# Patient Record
Sex: MALE | Race: WHITE | Employment: FULL TIME | URBAN - METROPOLITAN AREA
[De-identification: names, ages, dates, MRNs, and addresses within clinical notes are randomized per-mention and may not be internally consistent; named-entity substitution may affect disease eponyms.]

---

## 2020-08-06 ENCOUNTER — HOSPITAL ENCOUNTER (EMERGENCY)
Facility: CLINIC | Age: 45
Discharge: HOME | End: 2020-08-06
Attending: EMERGENCY MEDICINE | Admitting: EMERGENCY MEDICINE
Payer: COMMERCIAL

## 2020-08-06 VITALS
DIASTOLIC BLOOD PRESSURE: 83 MMHG | SYSTOLIC BLOOD PRESSURE: 127 MMHG | WEIGHT: 147 LBS | TEMPERATURE: 98.3 F | HEART RATE: 69 BPM | OXYGEN SATURATION: 96 % | RESPIRATION RATE: 18 BRPM

## 2020-08-06 DIAGNOSIS — M54.41 ACUTE RIGHT-SIDED LOW BACK PAIN WITH RIGHT-SIDED SCIATICA: ICD-10-CM

## 2020-08-06 PROCEDURE — 99282 EMERGENCY DEPT VISIT SF MDM: CPT | Performed by: EMERGENCY MEDICINE

## 2020-08-06 PROCEDURE — 99284 EMERGENCY DEPT VISIT MOD MDM: CPT | Mod: Z6 | Performed by: EMERGENCY MEDICINE

## 2020-08-06 RX ORDER — CYCLOBENZAPRINE HCL 10 MG
10 TABLET ORAL 3 TIMES DAILY PRN
Qty: 30 TABLET | Refills: 1 | Status: SHIPPED | OUTPATIENT
Start: 2020-08-06

## 2020-08-06 RX ORDER — HYDROCODONE BITARTRATE AND ACETAMINOPHEN 5; 325 MG/1; MG/1
1-2 TABLET ORAL EVERY 4 HOURS PRN
Qty: 15 TABLET | Refills: 0 | Status: SHIPPED | OUTPATIENT
Start: 2020-08-06

## 2020-08-06 NOTE — ED PROVIDER NOTES
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 He awoke this morning with severe right low back pain, sharper and radiating to the right leg to the level of the knee.  No relief with OTC analgesics.  No leg weakness or sensory deficit.  No bowel or bladder dysfunction.  No abdominal pain.  No fever or chills.  No prior history of any significant back problems    Allergies:  No Known Allergies    Problem List:    There are no active problems to display for this patient.       Past Medical History: Reviewed, noncontributory.  No past medical history on file.    Past Surgical History: Left inguinal herniorrhaphy  No past surgical history on file.    Family History: Noncontributory.  No family history on file.    Social History:  Marital Status:  Single [1]  Social History     Tobacco Use     Smoking status: Not on file   Substance Use Topics     Alcohol use: Not on file     Drug use: Not on file        Medications:    cyclobenzaprine (FLEXERIL) 10 MG tablet  HYDROcodone-acetaminophen (NORCO) 5-325 MG tablet        Review of Systems   Constitutional: Negative.    Respiratory: Negative.    Neurological: Negative.  Negative for weakness and numbness.       Physical Exam   BP: (!) 176/69  Pulse: 68  Temp: 98.3  F (36.8  C)  Resp: 18  Weight: 66.7 kg (147 lb)  SpO2: 96 %      Physical Exam  Vitals signs and nursing note reviewed.   Constitutional:       General: He is not in acute distress.     Appearance: Normal appearance. He is well-developed. He is not ill-appearing or diaphoretic.   HENT:      Head: Normocephalic and atraumatic.      Right Ear: External ear normal.       Left Ear: External ear normal.      Nose: Nose normal.      Mouth/Throat:      Mouth: Mucous membranes are moist.   Eyes:      General: No scleral icterus.     Extraocular Movements: Extraocular movements intact.      Conjunctiva/sclera: Conjunctivae normal.   Neck:      Musculoskeletal: Normal range of motion and neck supple.      Trachea: No tracheal deviation.   Cardiovascular:      Rate and Rhythm: Normal rate and regular rhythm.      Pulses: Normal pulses.   Pulmonary:      Effort: Pulmonary effort is normal. No respiratory distress.   Abdominal:      General: There is no distension.      Palpations: Abdomen is soft.      Tenderness: There is no abdominal tenderness. There is no right CVA tenderness or left CVA tenderness.   Musculoskeletal: Normal range of motion.         General: No swelling.      Lumbar back: He exhibits tenderness and spasm. He exhibits normal range of motion, no bony tenderness, no swelling and no deformity.        Back:       Right lower leg: No edema.      Left lower leg: No edema.   Skin:     General: Skin is warm and dry.      Coloration: Skin is not pale.      Findings: No erythema or rash.   Neurological:      General: No focal deficit present.      Mental Status: He is alert and oriented to person, place, and time.      Sensory: No sensory deficit.      Motor: No weakness.      Coordination: Coordination normal.   Psychiatric:         Mood and Affect: Mood normal.         Behavior: Behavior normal.         ED Course        Procedures               No results found for this or any previous visit (from the past 24 hour(s)).    Medications - No data to display    Assessments & Plan (with Medical Decision Making)   44-year-old  with insidious onset of atraumatic right low back pain yesterday who awoke with worsened pain and right upper leg radiculopathy to the right knee level, but no further.  No weakness or sensory deficit.  No evidence of cauda equina syndrome.  No  prior history of significant back issues.  He is neurologically intact and there is no current indication for imaging evaluation.  He was discharged with Rx for Flexeril for spasm and Norco to use if needed for pain refractory to NSAID.  I will make an orthopedic  referral for his follow-up.  He was provided instructions for supportive care and will return as needed for worsened condition or worsening symptoms, motor or sensory deficit or bowel or bladder incontinence, or for new problems or concerns.      I have reviewed the nursing notes.    I have reviewed the findings, diagnosis, plan and need for follow up with the patient.    New Prescriptions    CYCLOBENZAPRINE (FLEXERIL) 10 MG TABLET    Take 1 tablet (10 mg) by mouth 3 times daily as needed for muscle spasms    HYDROCODONE-ACETAMINOPHEN (NORCO) 5-325 MG TABLET    Take 1-2 tablets by mouth every 4 hours as needed for moderate to severe pain maximum 6 tablet(s) per day       Final diagnoses:   Acute right-sided low back pain with right-sided sciatica       8/6/2020   [facility] EMERGENCY DEPARTMENT     Mayur Siddiqi MD  08/08/20 [id number]

## 2020-08-06 NOTE — ED AVS SNAPSHOT
[facility] Emergency Department  [address] [address]  Phone:  [phone]  Fax:  [phone]                                    Randall Bateman   MRN: [id number]    Department:  [facility] Emergency Department   Date of Visit:  8/6/2020           After Visit Summary Signature Page    I have received my discharge instructions, and my questions have been answered. I have discussed any challenges I see with this plan with the nurse or doctor.    ..........................................................................................................................................  Patient/Patient Representative Signature      ..........................................................................................................................................  Patient Representative Print Name and Relationship to Patient    ..................................................               ................................................  Date                                   Time    ..........................................................................................................................................  Reviewed by Signature/Title    ...................................................              ..............................................  Date                                               Time          22EPIC Rev 08/18

## 2020-08-06 NOTE — ED NOTES
Right-sided low back pain that radiates down back and front of right leg that started this morning.  Patient stated that his back started hurting yesterday while he was at work.   Patient has a history of back pain.

## 2020-08-08 ASSESSMENT — ENCOUNTER SYMPTOMS
NEUROLOGICAL NEGATIVE: 1
CONSTITUTIONAL NEGATIVE: 1
RESPIRATORY NEGATIVE: 1
NUMBNESS: 0
WEAKNESS: 0

## 2020-08-10 ENCOUNTER — ANCILLARY PROCEDURE (OUTPATIENT)
Dept: GENERAL RADIOLOGY | Facility: CLINIC | Age: 45
End: 2020-08-10
Attending: FAMILY MEDICINE
Payer: COMMERCIAL

## 2020-08-10 ENCOUNTER — OFFICE VISIT (OUTPATIENT)
Dept: ORTHOPEDICS | Facility: CLINIC | Age: 45
End: 2020-08-10
Payer: COMMERCIAL

## 2020-08-10 VITALS — WEIGHT: 147 LBS | DIASTOLIC BLOOD PRESSURE: 70 MMHG | SYSTOLIC BLOOD PRESSURE: 122 MMHG

## 2020-08-10 DIAGNOSIS — M54.41 ACUTE RIGHT-SIDED LOW BACK PAIN WITH RIGHT-SIDED SCIATICA: ICD-10-CM

## 2020-08-10 PROCEDURE — 72100 X-RAY EXAM L-S SPINE 2/3 VWS: CPT

## 2020-08-10 PROCEDURE — 99204 OFFICE O/P NEW MOD 45 MIN: CPT | Performed by: FAMILY MEDICINE

## 2020-08-10 RX ORDER — METHYLPREDNISOLONE 4 MG
TABLET, DOSE PACK ORAL
Qty: 21 TABLET | Refills: 0 | Status: SHIPPED | OUTPATIENT
Start: 2020-08-10

## 2020-08-10 RX ORDER — NABUMETONE 500 MG/1
500 TABLET, FILM COATED ORAL 2 TIMES DAILY
Qty: 30 TABLET | Refills: 0 | Status: SHIPPED | OUTPATIENT
Start: 2020-08-10

## 2020-08-10 NOTE — LETTER
8/10/2020         RE: Randall Bateman  [id number]  [facility] [address]        Dear Colleague,    Thank you for referring your patient, Randall Bateman, to the [facility]. Please see a copy of my visit note below.    ASSESSMENT & PLAN  Randall was seen today for pain.    Diagnoses and all orders for this visit:    Acute right-sided low back pain with right-sided sciatica  -     Orthopedic & Spine  Referral  -     XR Lumbar Spine 2-3 Views*; Future  -     methylPREDNISolone (MEDROL DOSEPAK) 4 MG tablet therapy pack; Follow Package Directions  -     nabumetone (RELAFEN) 500 MG tablet; Take 1 tablet (500 mg) by mouth 2 times daily  -     PHYSICAL THERAPY REFERRAL (Internal); Future      Patient is a 44 year old male presenting for evaluation of   Chief Complaint   Patient presents with     Lower Back - Pain      # Right Lumbar Radiculopathy: Acute on chronic sx notable over the past 5 days 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  Pt noting right low back pain with radicular sx to the knee.  Pt has intact ROM, pos Slump on the right.  XR showing mild DDD.  No red flag symptoms/signs on history or examination.  Counseled patient on nature of condition and treatment options.  Given this plan as below, follow-up as needed.   Treatment: Activities as tolerated, letter written for work  Physical Therapy Referred today when pain is improving  Injection defer for now pt will need MRI should this be needed  Medications Medrol dosepak today, flexeril at night, can take Relafen after Medrol dosepak    Concerning signs/sx that would warrant urgent evaluation were discussed.  All questions were answered, patient understands and agrees with plan.      Return in about 3 weeks (around 8/31/2020).  -----    SUBJECTIVE  Randall Bateman is a/an 44 year old male who is seen in consultation at the request of  Mayur Siddiqi M.D. for evaluation of right sided low back  pain. The patient is seen by themselves.    Onset: 8/5/20, 5 day(s) ago. Reports insidious onset without acute precipitating event. Patient was seen in ED 8/6/20.   Location of Pain: right buttock, thigh to knee   Rating of Pain at worst: 10/10  Rating of Pain Currently: 9/10  Worsened by: standing, walking  Better with: hip flexion   Treatments tried:  Tylenol, Ibuprofen, flexeril, Norco   Quality: burning, sharp, dull  Red flags: Weakness: No, bowel/bladder loss: No, foot drop: No  Associated symptoms: numbness and tingling in anterior thigh   Orthopedic history: NO  Relevant surgical history: NO  Social: Works in construction   Hobbies: none  History reviewed. No pertinent past medical history.  Social History     Socioeconomic History     Marital status: Single     Spouse name: Not on file     Number of children: Not on file     Years of education: Not on file     Highest education level: Not on file   Occupational History     Not on file   Social Needs     Financial resource strain: Not on file     Food insecurity     Worry: Not on file     Inability: Not on file     Transportation needs     Medical: Not on file     Non-medical: Not on file   Tobacco Use     Smoking status: Not on file   Substance and Sexual Activity     Alcohol use: Not on file     Drug use: Not on file     Sexual activity: Not on file   Lifestyle     Physical activity     Days per week: Not on file     Minutes per session: Not on file     Stress: Not on file   Relationships     Social connections     Talks on phone: Not on file     Gets together: Not on file     Attends L[employer]-day service: Not on file     Active member of club or organization: Not on file     Attends meetings of clubs or organizations: Not on file     Relationship status: Not on file     Intimate partner violence     Fear of current or ex partner: Not on file     Emotionally abused: Not on file     Physically abused: Not on file     Forced sexual activity: Not on file   Other  Topics Concern     Not on file   Social History Narrative     Not on file     Patient's past medical, surgical, social, and family histories were reviewed today and no changes are noted.  No family history pertinent to the patient's problem today     REVIEW OF SYSTEMS:  10 point ROS is negative other than symptoms noted above in HPI, Past Medical History or as stated below  Constitutional: NEGATIVE for fever, chills, change in weight  Skin: NEGATIVE for worrisome rashes, moles or lesions  GI/: NEGATIVE for bowel or bladder changes  Neuro: NEGATIVE for weakness, dizziness or paresthesias    OBJECTIVE:  /70   Wt 66.7 kg (147 lb)    General: healthy, alert and in no distress  HEENT: no scleral icterus or conjunctival erythema  Skin: no suspicious lesions or rash. No jaundice.  CV: no pedal edema  Resp: normal respiratory effort without conversational dyspnea   Psych: normal mood and affect  Gait: normal steady gait with appropriate coordination and balance  Neuro: normal light touch sensory exam of the bilateral lower extremities.  DTR's 2+ patella and achilles bilaterally.  MSK:  THORACIC/LUMBAR SPINE  Inspection:    No gross deformity/asymmetry  Palpation:    Tender about the right para lumbar muscles, right SI joint and right sciatic notch. Otherwise remainder of landmarks are nontender.  Range of Motion:     Lumbar flexion full    Lumbar extension full  Strength:    5/5 - quadriceps, hamstrings, tibialis anterior, gastrocsoleus, and extensor hallicus longus  Special Tests:    Positive: slump test (right)    Negative: slump test left straight leg raise (bilateral), FREDDIE (bilateral), FADIR (bilateral)    BILATERAL HIP  Inspection:    No obvious deformity or asymmetry, pelvis level  Palpation:  Nontender.  Active Range of Motion:     Flexion full, IR full, ER  full  Strength:    Flexion 5/5, adduction 5/5, abduction 5/5  Special Tests:    Positive: None    Negative: Logroll, FREDDIE, anterior impingement  (FADIR)    Independent visualization of the below image:  Recent Results (from the past 24 hour(s))   XR Lumbar Spine 2-3 Views*    Narrative    LUMBAR SPINE TWO TO THREE VIEWS   8/10/2020 3:25 PM     HISTORY: Acute right-sided low back pain with right-sided sciatica.    COMPARISON: None.      Impression    IMPRESSION: Mild lower lumbar degenerative disc and facet disease is  present. Vertebral body heights are maintained. Posterior alignment is  normal. Sacroiliac joints are unremarkable.    RADHA CID MD       I  ordered, visualized and reviewed these images with the patient  Mild DDD no anterolisthesis     Patient's conditions were thoroughly discussed during today's visit with greater than 50% of the visit spent counseling the patient with total time spent face-to-face with the patient being 25 minutes.    Mark Alonso MD [facility]    Disclaimer: This note consists of symbols derived from keyboarding, dictation and/or voice recognition software. As a result, there may be errors in the script that have gone undetected. Please consider this when interpreting information found in this chart.          Again, thank you for allowing me to participate in the care of your patient.        Sincerely,        Mark Alonso MD

## 2020-08-10 NOTE — PATIENT INSTRUCTIONS
Diagnosis: Right lumbar radiculopathy  Image Findings: no significant degenerative changes  Treatment: Medrol dosepak today, start home exercises now, start PT when you are feeling better  Job: Letter written for work  Medications: Relafen after medrol dosepak, flexeril at night  Follow-up: 3-4 weeks, sooner if not improving    Please call [phone]   Ask for my team if you have any questions or concerns    It was great seeing you today!    Mark Alonso     Patient Education     Understanding Lumbar Radiculopathy    Lumbar radiculopathy is irritation or inflammation of a nerve root in the low back. It causes symptoms that spread out from the back down one or both legs. To understand this condition, it helps to understand the parts of the spine:    Vertebrae. These are bones that stack to form the spine. The lumbar spine contains the 5 bottom vertebrae.    Disks. These are soft pads of tissue between the vertebrae. They act as shock absorbers for the spine.    Spinal canal. This is a tunnel formed within the stacked vertebrae. In the lumbar spine, nerves run through this canal.    Nerves. These branch off and leave the spinal canal, traveling out to parts of the body. As they leave the spinal canal, nerves pass through openings between the vertebrae. The nerve root is the part of the nerve that is closest to the spinal canal.    Sciatic nerve. This is a large nerve formed from several nerve roots in the low back. This nerve extends down the back of the leg to the foot.  With lumbar radiculopathy, nerve roots in the low back become irritated. This leads to pain and symptoms. The sciatic nerve is commonly involved, so the condition is often called sciatica.  What causes lumbar radiculopathy?  Aging, injury, poor posture, extra body weight, and other issues can lead to problems in the low back. These problems may then irritate nerve roots. They include:    Damage to a disk in the lumbar spine. The damaged disk  may then press on nearby nerve roots.    Degeneration from wear and tear, and aging. This can lead to narrowing (stenosis) of the openings between the vertebrae. The narrowed openings press on nerve roots as they leave the spinal canal.    Unstable spine. This is when a vertebra slips forward. It can then press on a nerve root.  Other, less common things can put pressure on nerves in the low back. These include diabetes, infection, or a tumor.  Symptoms of lumbar radiculopathy  These include:    Pain in the low back    Pain, numbness, tingling, or weakness that travels into the buttocks, hip, groin, or leg    Muscle spasms  Treatment for lumbar radiculopathy  In most cases, your healthcare provider will first try treatments that help relieve symptoms. These may include:    Prescription and over-the-counter pain medicines. These help relieve pain, swelling, and irritation.    Limits on positions and activities that increase pain. But lying in bed or avoiding all movement is only recommended for a short period of time.    Physical therapy, including exercises and stretches. This helps decrease pain and increase movement and function.    Steroid shots into the lower back. This may help relieve symptoms for a time.    Weight-loss program. If you are overweight, losing extra pounds may help relieve symptoms.  In some cases, you may need surgery to fix the underlying problem. This depends on the cause, the symptoms, and how long the pain has lasted.  Possible complications  Over time, an irritated and inflamed nerve may become damaged. This may lead to long-lasting (permanent) numbness or weakness in your legs and feet. If symptoms change suddenly or get worse, be sure to let your healthcare provider know.  When to call your healthcare provider  Call your healthcare provider right away if you have any of these:    New pain or pain that gets worse    New or increasing weakness, tingling, or numbness in your leg or  foot    Problems controlling your bladder or bowel   Date Last Reviewed: 3/10/2016    [id number] [employer]. [address] [place] [address], [place] [address]. All rights reserved. This information is not intended as a substitute for professional medical care. Always follow your healthcare professional's instructions.

## 2020-08-10 NOTE — PROGRESS NOTES
ASSESSMENT & PLAN  Randall was seen today for pain.    Diagnoses and all orders for this visit:    Acute right-sided low back pain with right-sided sciatica  -     Orthopedic & Spine  Referral  -     XR Lumbar Spine 2-3 Views*; Future  -     methylPREDNISolone (MEDROL DOSEPAK) 4 MG tablet therapy pack; Follow Package Directions  -     nabumetone (RELAFEN) 500 MG tablet; Take 1 tablet (500 mg) by mouth 2 times daily  -     PHYSICAL THERAPY REFERRAL (Internal); Future      Patient is a 44 year old male presenting for evaluation of   Chief Complaint   Patient presents with     Lower Back - Pain      # Right Lumbar Radiculopathy: Acute on chronic sx notable over the past 5 days 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  Pt noting right low back pain with radicular sx to the knee.  Pt has intact ROM, pos Slump on the right.  XR showing mild DDD.  No red flag symptoms/signs on history or examination.  Counseled patient on nature of condition and treatment options.  Given this plan as below, follow-up as needed.   Treatment: Activities as tolerated, letter written for work  Physical Therapy Referred today when pain is improving  Injection defer for now pt will need MRI should this be needed  Medications Medrol dosepak today, flexeril at night, can take Relafen after Medrol dosepak    Concerning signs/sx that would warrant urgent evaluation were discussed.  All questions were answered, patient understands and agrees with plan.      Return in about 3 weeks (around 8/31/2020).  -----    SUBJECTIVE  Randall Bateman is a/an 44 year old male who is seen in consultation at the request of  Mayur Siddiqi M.D. for evaluation of right sided low back pain. The patient is seen by themselves.    Onset: 8/5/20, 5 day(s) ago. Reports insidious onset without acute precipitating event. Patient was seen in ED 8/6/20.   Location of Pain: right buttock, thigh to knee   Rating of Pain at worst: 10/10  Rating of Pain  Currently: 9/10  Worsened by: standing, walking  Better with: hip flexion   Treatments tried:  Tylenol, Ibuprofen, flexeril, Norco   Quality: burning, sharp, dull  Red flags: Weakness: No, bowel/bladder loss: No, foot drop: No  Associated symptoms: numbness and tingling in anterior thigh   Orthopedic history: NO  Relevant surgical history: NO  Social: Works in construction   Hobbies: none  History reviewed. No pertinent past medical history.  Social History     Socioeconomic History     Marital status: Single     Spouse name: Not on file     Number of children: Not on file     Years of education: Not on file     Highest education level: Not on file   Occupational History     Not on file   Social Needs     Financial resource strain: Not on file     Food insecurity     Worry: Not on file     Inability: Not on file     Transportation needs     Medical: Not on file     Non-medical: Not on file   Tobacco Use     Smoking status: Not on file   Substance and Sexual Activity     Alcohol use: Not on file     Drug use: Not on file     Sexual activity: Not on file   Lifestyle     Physical activity     Days per week: Not on file     Minutes per session: Not on file     Stress: Not on file   Relationships     Social connections     Talks on phone: Not on file     Gets together: Not on file     Attends Druze service: Not on file     Active member of club or organization: Not on file     Attends meetings of clubs or organizations: Not on file     Relationship status: Not on file     Intimate partner violence     Fear of current or ex partner: Not on file     Emotionally abused: Not on file     Physically abused: Not on file     Forced sexual activity: Not on file   Other Topics Concern     Not on file   Social History Narrative     Not on file     Patient's past medical, surgical, social, and family histories were reviewed today and no changes are noted.  No family history pertinent to the patient's problem today     REVIEW OF  SYSTEMS:  10 point ROS is negative other than symptoms noted above in HPI, Past Medical History or as stated below  Constitutional: NEGATIVE for fever, chills, change in weight  Skin: NEGATIVE for worrisome rashes, moles or lesions  GI/: NEGATIVE for bowel or bladder changes  Neuro: NEGATIVE for weakness, dizziness or paresthesias    OBJECTIVE:  /70   Wt 66.7 kg (147 lb)    General: healthy, alert and in no distress  HEENT: no scleral icterus or conjunctival erythema  Skin: no suspicious lesions or rash. No jaundice.  CV: no pedal edema  Resp: normal respiratory effort without conversational dyspnea   Psych: normal mood and affect  Gait: normal steady gait with appropriate coordination and balance  Neuro: normal light touch sensory exam of the bilateral lower extremities.  DTR's 2+ patella and achilles bilaterally.  MSK:  THORACIC/LUMBAR SPINE  Inspection:    No gross deformity/asymmetry  Palpation:    Tender about the right para lumbar muscles, right SI joint and right sciatic notch. Otherwise remainder of landmarks are nontender.  Range of Motion:     Lumbar flexion full    Lumbar extension full  Strength:    5/5 - quadriceps, hamstrings, tibialis anterior, gastrocsoleus, and extensor hallicus longus  Special Tests:    Positive: slump test (right)    Negative: slump test left straight leg raise (bilateral), FREDDIE (bilateral), FADIR (bilateral)    BILATERAL HIP  Inspection:    No obvious deformity or asymmetry, pelvis level  Palpation:  Nontender.  Active Range of Motion:     Flexion full, IR full, ER  full  Strength:    Flexion 5/5, adduction 5/5, abduction 5/5  Special Tests:    Positive: None    Negative: Logroll, FREDDIE, anterior impingement (FADIR)    Independent visualization of the below image:  Recent Results (from the past 24 hour(s))   XR Lumbar Spine 2-3 Views*    Narrative    LUMBAR SPINE TWO TO THREE VIEWS   8/10/2020 3:25 PM     HISTORY: Acute right-sided low back pain with right-sided  sciatica.    COMPARISON: None.      Impression    IMPRESSION: Mild lower lumbar degenerative disc and facet disease is  present. Vertebral body heights are maintained. Posterior alignment is  normal. Sacroiliac joints are unremarkable.    RADHA CID MD       I  ordered, visualized and reviewed these images with the patient  Mild DDD no anterolisthesis     Patient's conditions were thoroughly discussed during today's visit with greater than 50% of the visit spent counseling the patient with total time spent face-to-face with the patient being 25 minutes.    Mark Alonso MD [facility]    Disclaimer: This note consists of symbols derived from keyboarding, dictation and/or voice recognition software. As a result, there may be errors in the script that have gone undetected. Please consider this when interpreting information found in this chart.

## 2020-08-10 NOTE — LETTER
[facility]  [address]  [place]  [address]  [phone]        Randall Bateman, male, 1975  [address]  [place] [address]    Employer: [employer]    Date of Treatment: 8/10/2020      Diagnosis:   1. Acute right-sided low back pain with right-sided sciatica        Work Related:  unknown     When the patient returns to work, the following restrictions apply until 8/31/20:  A) Bend: Occasionally (1-3 hours)  B) Squat: Occasionally (1-3 hours)  C) Walk/Stand: Frequently (4-8 hours)  D) Reach Above Shoulders: Frequently (4-8 hours)  E) Lift, carry, push, and pull no more than:  11-20 lbs.       Other restrictions: None    FOLLOW-UP  Follow up with sports medicine 3 weeks.    Mark Alonso MD

## 2020-08-31 ENCOUNTER — OFFICE VISIT (OUTPATIENT)
Dept: ORTHOPEDICS | Facility: CLINIC | Age: 45
End: 2020-08-31
Payer: COMMERCIAL

## 2020-08-31 VITALS — SYSTOLIC BLOOD PRESSURE: 116 MMHG | WEIGHT: 147 LBS | DIASTOLIC BLOOD PRESSURE: 72 MMHG

## 2020-08-31 DIAGNOSIS — M54.41 ACUTE RIGHT-SIDED LOW BACK PAIN WITH RIGHT-SIDED SCIATICA: Primary | ICD-10-CM

## 2020-08-31 PROCEDURE — 99213 OFFICE O/P EST LOW 20 MIN: CPT | Performed by: FAMILY MEDICINE

## 2020-08-31 NOTE — PATIENT INSTRUCTIONS
Diagnosis: Right lumbar radiculopathy  Image Findings: normal back x-ray  Treatment: Referral to physical therapy  Job: as tolerated  Medications: Limited tylenol/ibuprofen for pain for 1-2 weeks  Follow-up: As needed    Please call [phone]   Ask for my team if you have any questions or concerns    It was great seeing you again today!    Mark Alonso    Patient Education     Understanding Lumbar Radiculopathy    Lumbar radiculopathy is irritation or inflammation of a nerve root in the low back. It causes symptoms that spread out from the back down one or both legs. To understand this condition, it helps to understand the parts of the spine:    Vertebrae. These are bones that stack to form the spine. The lumbar spine contains the 5 bottom vertebrae.    Disks. These are soft pads of tissue between the vertebrae. They act as shock absorbers for the spine.    Spinal canal. This is a tunnel formed within the stacked vertebrae. In the lumbar spine, nerves run through this canal.    Nerves. These branch off and leave the spinal canal, traveling out to parts of the body. As they leave the spinal canal, nerves pass through openings between the vertebrae. The nerve root is the part of the nerve that is closest to the spinal canal.    Sciatic nerve. This is a large nerve formed from several nerve roots in the low back. This nerve extends down the back of the leg to the foot.  With lumbar radiculopathy, nerve roots in the low back become irritated. This leads to pain and symptoms. The sciatic nerve is commonly involved, so the condition is often called sciatica.  What causes lumbar radiculopathy?  Aging, injury, poor posture, extra body weight, and other issues can lead to problems in the low back. These problems may then irritate nerve roots. They include:    Damage to a disk in the lumbar spine. The damaged disk may then press on nearby nerve roots.    Degeneration from wear and tear, and aging. This can lead to  narrowing (stenosis) of the openings between the vertebrae. The narrowed openings press on nerve roots as they leave the spinal canal.    Unstable spine. This is when a vertebra slips forward. It can then press on a nerve root.  Other, less common things can put pressure on nerves in the low back. These include diabetes, infection, or a tumor.  Symptoms of lumbar radiculopathy  These include:    Pain in the low back    Pain, numbness, tingling, or weakness that travels into the buttocks, hip, groin, or leg    Muscle spasms  Treatment for lumbar radiculopathy  In most cases, your healthcare provider will first try treatments that help relieve symptoms. These may include:    Prescription and over-the-counter pain medicines. These help relieve pain, swelling, and irritation.    Limits on positions and activities that increase pain. But lying in bed or avoiding all movement is only recommended for a short period of time.    Physical therapy, including exercises and stretches. This helps decrease pain and increase movement and function.    Steroid shots into the lower back. This may help relieve symptoms for a time.    Weight-loss program. If you are overweight, losing extra pounds may help relieve symptoms.  In some cases, you may need surgery to fix the underlying problem. This depends on the cause, the symptoms, and how long the pain has lasted.  Possible complications  Over time, an irritated and inflamed nerve may become damaged. This may lead to long-lasting (permanent) numbness or weakness in your legs and feet. If symptoms change suddenly or get worse, be sure to let your healthcare provider know.  When to call your healthcare provider  Call your healthcare provider right away if you have any of these:    New pain or pain that gets worse    New or increasing weakness, tingling, or numbness in your leg or foot    Problems controlling your bladder or bowel   Date Last Reviewed: 3/10/2016    8637-4920 [employer]  [facility] [address] [place] [address], [place] [address]. All rights reserved. This information is not intended as a substitute for professional medical care. Always follow your healthcare professional's instructions.

## 2020-08-31 NOTE — LETTER
8/31/2020         RE: Randall Bateman  [address]  [facility] [address]        Dear Colleague,    Thank you for referring your patient, Randall Bateman, to the [facility]. Please see a copy of my visit note below.    ASSESSMENT & PLAN  Randall was seen today for follow up.    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  Pt noting right low back pain with radicular sx to the knee.  Pt still has intact ROM, pos Slump on the right.  XR showing mild DDD.  No red flag symptoms/signs on history or examination.  Sx overall improved.  Counseled patient on nature of condition and treatment options.  Given this plan as below, follow-up as needed.   Treatment: Activities as tolerated, letter written for work  Physical Therapy Referred today when pain is improving  Injection defer for now pt will need MRI should this be needed  Medications  Limited tylenol/ibuprofen for pain for 1-2 weeks      Concerning signs/sx that would warrant urgent evaluation were discussed.  All questions were answered, patient understands and agrees with plan.      Return if symptoms worsen or fail to improve.  -----    SUBJECTIVE  Randall Bateman is a/an 44 year old male who is seen in consultation at the request of  Mayur Siddiqi M.D. for evaluation of right sided low back pain. The patient is seen by themselves.    Onset: 8/5/20, 5 day(s) ago. Reports insidious onset without acute precipitating event. Patient was seen in ED 8/6/20.   Location of Pain: right buttock, thigh to knee   Rating of Pain at worst: 10/10  Rating of Pain Currently: 9/10  Worsened by: standing,  walking  Better with: hip flexion   Treatments tried:  Tylenol, Ibuprofen, flexeril, Norco   Quality: burning, sharp, dull  Red flags: Weakness: No, bowel/bladder loss: No, foot drop: No  Associated symptoms: numbness and tingling in anterior thigh   Orthopedic history: NO  Relevant surgical history: NO  Social: Works in construction   Hobbies: none    Interim History - August 31, 2020  Since last visit on 8/10/2020 patient has improved pain. States that he is about 60% improved.  He has been treating with chiropractor.  Persisting numbness in right leg   No interim injury.  Relafen gave pt strong dreams.    No past medical history on file.  Social History     Socioeconomic History     Marital status: Single     Spouse name: Not on file     Number of children: Not on file     Years of education: Not on file     Highest education level: Not on file   Occupational History     Not on file   Social Needs     Financial resource strain: Not on file     Food insecurity     Worry: Not on file     Inability: Not on file     Transportation needs     Medical: Not on file     Non-medical: Not on file   Tobacco Use     Smoking status: Not on file   Substance and Sexual Activity     Alcohol use: Not on file     Drug use: Not on file     Sexual activity: Not on file   Lifestyle     Physical activity     Days per week: Not on file     Minutes per session: Not on file     Stress: Not on file   Relationships     Social connections     Talks on phone: Not on file     Gets together: Not on file     Attends Tenriism service: Not on file     Active member of club or organization: Not on file     Attends meetings of clubs or organizations: Not on file     Relationship status: Not on file     Intimate partner violence     Fear of current or ex partner: Not on file     Emotionally abused: Not on file     Physically abused: Not on file     Forced sexual activity: Not on file   Other Topics Concern     Not on file   Social History Narrative      Not on file     Patient's past medical, surgical, social, and family histories were reviewed today and no changes are noted.  No family history pertinent to the patient's problem today     REVIEW OF SYSTEMS:  10 point ROS is negative other than symptoms noted above in HPI, Past Medical History or as stated below  Constitutional: NEGATIVE for fever, chills, change in weight  Skin: NEGATIVE for worrisome rashes, moles or lesions  GI/: NEGATIVE for bowel or bladder changes  Neuro: NEGATIVE for weakness, dizziness or paresthesias    OBJECTIVE:  /72   Wt 66.7 kg (147 lb)    General: healthy, alert and in no distress  HEENT: no scleral icterus or conjunctival erythema  Skin: no suspicious lesions or rash. No jaundice.  CV: no pedal edema  Resp: normal respiratory effort without conversational dyspnea   Psych: normal mood and affect  Gait: normal steady gait with appropriate coordination and balance  Neuro: normal light touch sensory exam of the bilateral lower extremities.  DTR's 2+ patella and achilles bilaterally.  MSK:  THORACIC/LUMBAR SPINE  Inspection:    No gross deformity/asymmetry  Palpation:    Tender about the right para lumbar muscles, right SI joint and right sciatic notch. Otherwise remainder of landmarks are nontender.  Range of Motion:     Lumbar flexion full    Lumbar extension full  Strength:    5/5 - quadriceps, hamstrings, tibialis anterior, gastrocsoleus, and extensor hallicus longus  Special Tests:    Positive: slump test (right)    Negative: slump test left straight leg raise (bilateral), FREDDIE (bilateral), FADIR (bilateral)    BILATERAL HIP  Inspection:    No obvious deformity or asymmetry, pelvis level  Palpation:  Nontender.  Active Range of Motion:     Flexion full, IR full, ER  full  Strength:    Flexion 5/5, adduction 5/5, abduction 5/5  Special Tests:    Positive: None    Negative: Logroll, FREDDIE, anterior impingement (FADIR)    Independent visualization of the below image:  No  results found for this or any previous visit (from the past 24 hour(s)).  LUMBAR SPINE TWO TO THREE VIEWS   8/10/2020 3:25 PM      HISTORY: Acute right-sided low back pain with right-sided sciatica.     COMPARISON: None.                                                                    IMPRESSION: Mild lower lumbar degenerative disc and facet disease is  present. Vertebral body heights are maintained. Posterior alignment is  normal. Sacroiliac joints are unremarkable.     RADHA CID MD  I  ordered, visualized and reviewed these images with the patient  Mild DDD no anterolisthesis     Patient's conditions were thoroughly discussed during today's visit with greater than 50% of the visit spent counseling the patient with total time spent face-to-face with the patient being 25 minutes.    Mark Alonso MD [facility]    Disclaimer: This note consists of symbols derived from keyboarding, dictation and/or voice recognition software. As a result, there may be errors in the script that have gone undetected. Please consider this when interpreting information found in this chart.          Again, thank you for allowing me to participate in the care of your patient.        Sincerely,        Mark Alonso MD

## 2020-08-31 NOTE — PROGRESS NOTES
ASSESSMENT & PLAN  Randall was seen today for follow up.    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  Pt noting right low back pain with radicular sx to the knee.  Pt still has intact ROM, pos Slump on the right.  XR showing mild DDD.  No red flag symptoms/signs on history or examination.  Sx overall improved.  Counseled patient on nature of condition and treatment options.  Given this plan as below, follow-up as needed.   Treatment: Activities as tolerated, letter written for work  Physical Therapy Referred today when pain is improving  Injection defer for now pt will need MRI should this be needed  Medications  Limited tylenol/ibuprofen for pain for 1-2 weeks      Concerning signs/sx that would warrant urgent evaluation were discussed.  All questions were answered, patient understands and agrees with plan.      Return if symptoms worsen or fail to improve.  -----    SUBJECTIVE  Randall Bateman is a/an 44 year old male who is seen in consultation at the request of  Mayur iSddiqi M.D. for evaluation of right sided low back pain. The patient is seen by themselves.    Onset: 8/5/20, 5 day(s) ago. Reports insidious onset without acute precipitating event. Patient was seen in ED 8/6/20.   Location of Pain: right buttock, thigh to knee   Rating of Pain at worst: 10/10  Rating of Pain Currently: 9/10  Worsened by: standing, walking  Better with: hip flexion   Treatments tried:  Tylenol, Ibuprofen, flexeril, Norco   Quality: burning, sharp, dull  Red flags: Weakness: No, bowel/bladder loss: No, foot drop: No  Associated symptoms: numbness and tingling in anterior thigh   Orthopedic history:  NO  Relevant surgical history: NO  Social: Works in construction   Hobbies: none    Interim History - August 31, 2020  Since last visit on 8/10/2020 patient has improved pain. States that he is about 60% improved.  He has been treating with chiropractor.  Persisting numbness in right leg   No interim injury.  Relafen gave pt strong dreams.    No past medical history on file.  Social History     Socioeconomic History     Marital status: Single     Spouse name: Not on file     Number of children: Not on file     Years of education: Not on file     Highest education level: Not on file   Occupational History     Not on file   Social Needs     Financial resource strain: Not on file     Food insecurity     Worry: Not on file     Inability: Not on file     Transportation needs     Medical: Not on file     Non-medical: Not on file   Tobacco Use     Smoking status: Not on file   Substance and Sexual Activity     Alcohol use: Not on file     Drug use: Not on file     Sexual activity: Not on file   Lifestyle     Physical activity     Days per week: Not on file     Minutes per session: Not on file     Stress: Not on file   Relationships     Social connections     Talks on phone: Not on file     Gets together: Not on file     Attends Spiritism service: Not on file     Active member of club or organization: Not on file     Attends meetings of clubs or organizations: Not on file     Relationship status: Not on file     Intimate partner violence     Fear of current or ex partner: Not on file     Emotionally abused: Not on file     Physically abused: Not on file     Forced sexual activity: Not on file   Other Topics Concern     Not on file   Social History Narrative     Not on file     Patient's past medical, surgical, social, and family histories were reviewed today and no changes are noted.  No family history pertinent to the patient's problem today     REVIEW OF SYSTEMS:  10 point ROS is negative other than symptoms noted above  in HPI, Past Medical History or as stated below  Constitutional: NEGATIVE for fever, chills, change in weight  Skin: NEGATIVE for worrisome rashes, moles or lesions  GI/: NEGATIVE for bowel or bladder changes  Neuro: NEGATIVE for weakness, dizziness or paresthesias    OBJECTIVE:  /72   Wt 66.7 kg (147 lb)    General: healthy, alert and in no distress  HEENT: no scleral icterus or conjunctival erythema  Skin: no suspicious lesions or rash. No jaundice.  CV: no pedal edema  Resp: normal respiratory effort without conversational dyspnea   Psych: normal mood and affect  Gait: normal steady gait with appropriate coordination and balance  Neuro: normal light touch sensory exam of the bilateral lower extremities.  DTR's 2+ patella and achilles bilaterally.  MSK:  THORACIC/LUMBAR SPINE  Inspection:    No gross deformity/asymmetry  Palpation:    Tender about the right para lumbar muscles, right SI joint and right sciatic notch. Otherwise remainder of landmarks are nontender.  Range of Motion:     Lumbar flexion full    Lumbar extension full  Strength:    5/5 - quadriceps, hamstrings, tibialis anterior, gastrocsoleus, and extensor hallicus longus  Special Tests:    Positive: slump test (right)    Negative: slump test left straight leg raise (bilateral), FREDDIE (bilateral), FADIR (bilateral)    BILATERAL HIP  Inspection:    No obvious deformity or asymmetry, pelvis level  Palpation:  Nontender.  Active Range of Motion:     Flexion full, IR full, ER  full  Strength:    Flexion 5/5, adduction 5/5, abduction 5/5  Special Tests:    Positive: None    Negative: Logroll, FREDDIE, anterior impingement (FADIR)    Independent visualization of the below image:  No results found for this or any previous visit (from the past 24 hour(s)).  LUMBAR SPINE TWO TO THREE VIEWS   8/10/2020 3:25 PM      HISTORY: Acute right-sided low back pain with right-sided sciatica.     COMPARISON: None.                                                                     IMPRESSION: Mild lower lumbar degenerative disc and facet disease is  present. Vertebral body heights are maintained. Posterior alignment is  normal. Sacroiliac joints are unremarkable.     RADHA CID MD  I  ordered, visualized and reviewed these images with the patient  Mild DDD no anterolisthesis     Patient's conditions were thoroughly discussed during today's visit with greater than 50% of the visit spent counseling the patient with total time spent face-to-face with the patient being 25 minutes.    Mark Alonso MD [facility]    Disclaimer: This note consists of symbols derived from keyboarding, dictation and/or voice recognition software. As a result, there may be errors in the script that have gone undetected. Please consider this when interpreting information found in this chart.

## 2022-01-28 ENCOUNTER — HOSPITAL ENCOUNTER (OUTPATIENT)
Dept: GENERAL RADIOLOGY | Facility: CLINIC | Age: 47
End: 2022-01-28
Attending: PHYSICIAN ASSISTANT
Payer: COMMERCIAL

## 2022-01-28 ENCOUNTER — HOSPITAL ENCOUNTER (OUTPATIENT)
Dept: MRI IMAGING | Facility: CLINIC | Age: 47
End: 2022-01-28
Attending: PHYSICIAN ASSISTANT
Payer: COMMERCIAL

## 2022-01-28 DIAGNOSIS — Z01.89 ENCOUNTER FOR IMAGING TO SCREEN FOR METAL PRIOR TO MAGNETIC RESONANCE IMAGING (MRI): ICD-10-CM

## 2022-01-28 DIAGNOSIS — M54.16 LUMBAR RADICULOPATHY: ICD-10-CM

## 2022-01-28 DIAGNOSIS — M48.061 SPINAL STENOSIS, LUMBAR REGION, WITHOUT NEUROGENIC CLAUDICATION: ICD-10-CM

## 2022-01-28 PROCEDURE — 70030 X-RAY EYE FOR FOREIGN BODY: CPT

## 2022-01-28 PROCEDURE — 72148 MRI LUMBAR SPINE W/O DYE: CPT

## 2022-02-08 ENCOUNTER — TRANSCRIBE ORDERS (OUTPATIENT)
Dept: OTHER | Age: 47
End: 2022-02-08

## 2022-02-08 DIAGNOSIS — M54.16 LUMBAR RADICULOPATHY: ICD-10-CM

## 2022-02-08 DIAGNOSIS — M51.26 HNP (HERNIATED NUCLEUS PULPOSUS), LUMBAR: Primary | ICD-10-CM

## 2022-02-15 ENCOUNTER — HOSPITAL ENCOUNTER (EMERGENCY)
Facility: CLINIC | Age: 47
Discharge: STILL ADMITTED OR ADMINISTRATIVE | End: 2022-02-15
Payer: COMMERCIAL

## 2022-02-15 ENCOUNTER — HOSPITAL ENCOUNTER (OUTPATIENT)
Dept: PALLIATIVE MEDICINE | Facility: CLINIC | Age: 47
Discharge: HOME | End: 2022-02-15
Admitting: STUDENT IN AN ORGANIZED HEALTH CARE EDUCATION/TRAINING PROGRAM
Payer: COMMERCIAL

## 2022-02-15 VITALS
HEART RATE: 80 BPM | RESPIRATION RATE: 20 BRPM | DIASTOLIC BLOOD PRESSURE: 82 MMHG | TEMPERATURE: 97.9 F | SYSTOLIC BLOOD PRESSURE: 142 MMHG

## 2022-02-15 DIAGNOSIS — M54.16 LUMBAR RADICULOPATHY: ICD-10-CM

## 2022-02-15 DIAGNOSIS — M51.26 HNP (HERNIATED NUCLEUS PULPOSUS), LUMBAR: ICD-10-CM

## 2022-02-15 PROCEDURE — 250N000011 HC RX IP 250 OP 636: Performed by: STUDENT IN AN ORGANIZED HEALTH CARE EDUCATION/TRAINING PROGRAM

## 2022-02-15 PROCEDURE — 64484 NJX AA&/STRD TFRM EPI L/S EA: CPT | Mod: LT | Performed by: STUDENT IN AN ORGANIZED HEALTH CARE EDUCATION/TRAINING PROGRAM

## 2022-02-15 PROCEDURE — 255N000002 HC RX 255 OP 636: Performed by: STUDENT IN AN ORGANIZED HEALTH CARE EDUCATION/TRAINING PROGRAM

## 2022-02-15 PROCEDURE — 250N000009 HC RX 250: Performed by: STUDENT IN AN ORGANIZED HEALTH CARE EDUCATION/TRAINING PROGRAM

## 2022-02-15 PROCEDURE — 64483 NJX AA&/STRD TFRM EPI L/S 1: CPT | Mod: LT | Performed by: STUDENT IN AN ORGANIZED HEALTH CARE EDUCATION/TRAINING PROGRAM

## 2022-02-15 RX ORDER — DEXAMETHASONE SODIUM PHOSPHATE 10 MG/ML
10 INJECTION, SOLUTION INTRAMUSCULAR; INTRAVENOUS ONCE
Status: COMPLETED | OUTPATIENT
Start: 2022-02-15 | End: 2022-02-15

## 2022-02-15 RX ORDER — BUPIVACAINE HYDROCHLORIDE 2.5 MG/ML
10 INJECTION, SOLUTION INFILTRATION; PERINEURAL ONCE
Status: COMPLETED | OUTPATIENT
Start: 2022-02-15 | End: 2022-02-15

## 2022-02-15 RX ORDER — IOPAMIDOL 408 MG/ML
10 INJECTION, SOLUTION INTRATHECAL ONCE
Status: COMPLETED | OUTPATIENT
Start: 2022-02-15 | End: 2022-02-15

## 2022-02-15 RX ADMIN — BUPIVACAINE HYDROCHLORIDE 1 ML: 2.5 INJECTION, SOLUTION EPIDURAL; INFILTRATION; INTRACAUDAL; PERINEURAL at 13:44

## 2022-02-15 RX ADMIN — DEXAMETHASONE SODIUM PHOSPHATE 10 MG: 10 INJECTION, SOLUTION INTRAMUSCULAR; INTRAVENOUS at 13:44

## 2022-02-15 RX ADMIN — IOPAMIDOL 1 ML: 408 INJECTION, SOLUTION INTRATHECAL at 13:44

## 2022-02-15 RX ADMIN — LIDOCAINE HYDROCHLORIDE 2 ML: 10 INJECTION, SOLUTION EPIDURAL; INFILTRATION; INTRACAUDAL; PERINEURAL at 13:44

## 2022-02-15 NOTE — DISCHARGE INSTRUCTIONS
[facility]   Procedure Discharge Instructions    Today you saw:    Dr. Patience Johnson    You had an:  Epidural steroid injection      Medications used:  Lidocaine   Bupivacaine   Dexamethasone   Isovue   Normal saline        Be cautious when walking. Numbness and/or weakness in the lower extremities may occur for up to 6-8 hours after the procedure due to effect of the local anesthetic    Do not drive for 6 hours. The effect of the local anesthetic could slow your reflexes.     You may resume your regular activities after 24 hours    Avoid strenuous activity for the first 24 hours    You may shower, however avoid swimming, tub baths or hot tubs for 24 hours following your procedure    You may have a mild to moderate increase in pain for several days following the injection.    It may take up to 14 days for the steroid medication to start working although you may feel the effect as early as a few days after the procedure.       You may use ice packs for 10-15 minutes, 3 to 4 times a day at the injection site for comfort    Do not use heat to painful areas for 6 to 8 hours. This will give the local anesthetic time to wear off and prevent you from accidentally burning your skin.     Unless you have been directed to avoid the use of anti-inflammatory medications (NSAIDS), you may use medications such as ibuprofen, Aleve or Tylenol for pain control if needed.     Possible side effects of steroids that you may experience include flushing, elevated blood pressure, increased appetite, mild headaches and restlessness.  All of these symptoms will get better with time.    If you experience any of the following, call the Pain Clinic during work hours (Mon-Friday 8-4:30 pm) at [phone] or the Provider Line after hours at [phone]:  -Fever over 100 degree F  -Swelling, bleeding, redness, drainage, warmth at the injection site  -Progressive weakness or numbness in your legs   -Loss of bowel  or bladder function  -Unusual new onset of pain that is not improving

## 2022-02-15 NOTE — PROGRESS NOTES
Pre-procedure Intake  If YES to any questions or NO to having a   Please complete laminated checklist and leave on the computer keyboard for Provider, verbally inform provider if able.    For SCS Trial, RFA's or any sedation procedure:  Have you been fasting? NA    If yes, for how long?     Are you taking any any blood thinners such as Coumadin, Warfarin, Jantoven, Pradaxa Xarelto, Eliquis, Edoxaban, Enoxaparin, Lovenox, Heparin, Arixtra, Fondaparinux, or Fragmin? OR Antiplatelet medication such as Plavix, Brilinta, or Effient?   No     If yes, when did you take your last dose?     Do you take aspirin?  No    If cervical procedure, have you held aspirin for 6 days?   NA    Do you have any allergies to contrast dye, iodine, steroid and/or numbing medications?  NO    Are you currently taking antibiotics or have an active infection?  NO    Have you had a fever/elevated temperature within the past week? NO    Are you currently taking oral steroids? NO    Do you have a ? Yes    Are you pregnant or breastfeeding?  Not Applicable    Have you received the COVID-19 vaccine? No    Notify provider and RNs if systolic BP >170, diastolic BP >100, P >100 or O2 sats < 90%

## 2022-02-21 NOTE — PROGRESS NOTES
"Pre procedure Diagnosis: lumbar radiculopathy   Post procedure Diagnosis: Same  Procedure performed: Left L4/5 and L3/4 transforaminal epidural steroid injections  Note, optimal needle placement in the left L4/5 foramen resulted in primarily lateral dye spread along the course of the left L4 nerve. MRI demonstrated disc extrusion effacing the entrance to the left L4/5 foramen, likely precluding sufficient medial dye spread into the epidural space. This possibility was discussed with the patient prior to procedure. During the procedure the decision was made, with patient's verbal consent, to perform an additional injection at the left L3/4 level  Anesthesia: none  Complications: none  Operators: Patience Johnson MD     Needle: 22g 5\" spinal  Injectate: 2ml 0.25% bupivacaine, 10mg of dexamethasone    Indications:   Randall Bateman is a 46 year old male was sent by Ruel Walton PA-C at [facility] for left L4/5 Transforaminal epidural steroid injection.  he has a history of severe left sided leg pain.  Exam shows slightly antalgic gait and he has tried conservative treatment including medications.    MRI was done on 1/28/2022 which showed                                                                    IMPRESSION:  1.  There is a large left paracentral caudal disc extrusion at L3-L4  that impinges upon the left L4 nerve root and probably the left L5  nerve root. The herniation causes moderate spinal canal narrowing at  L3-L4 and contributes to mild spinal canal narrowing at L4-L5.  2.  At L4-L5, there is also moderate right foraminal narrowing.  3.  At L1-L2, there is mild left lateral recess narrowing.  4.  At L2-L3, there is mild lateral recess and right foraminal  narrowing.  5.  At L5-S1, small central disc herniation abuts the S1 nerve root  sleeves without definitely displacing them. Mild left foraminal  Narrowing.    Options/alternatives, benefits and risks were discussed with the " patient including bleeding, infection, tissue trauma, numbness, weakness, paralysis, spinal cord injury, radiation exposure, headache and reaction to medications. Questions were answered to his satisfaction and he agrees to proceed. Voluntary informed consent was obtained and signed.     Vitals were reviewed: Yes  Allergies were reviewed:  Yes   Medications were reviewed:  Yes   Pre-procedure pain score: 7/10    Procedure:  After getting informed consent, patient was brought into the procedure suite and was placed in a prone position on the procedure table.   A Pause for the Cause was performed.  Patient was prepped and draped in sterile fashion.     After identifying the left L4/5 neuroforamen, then left L3/4 neuroforamen, the C-arm was rotated to a left lateral oblique angle.  A total of 2ml of Lidocaine 1% was used to anesthetize the skin and the needle track at a skin entry site coaxial with the fluoroscopy beam, and overriding the superior aspect of the neuroforamena. Then a 22 gauge 5 inch spinal needle was placed coaxial with the fluoroscopy beam and overriding the superior aspect of the neuroforamena. The spinal needle was advanced under intermittent fluoroscopy until it entered the foramena superiorly.    The position was then inspected from anteroposterior and lateral views, and the needle adjusted appropriately.  A total of 1ml of Isovue 200 was injected, confirming appropriate position, with spread into the epidural space, with no intravascular uptake. 9ml was wasted    2ml of 0.25% bupivacaine and 10mg of dexamethasone were injected from separate syringes, divided equally between the 2 neuroforamena. The tubing was flushed with contrast dye between administering injectates.  The needle was removed.    During the procedure, there was not a paresthesia.  Hemostasis was achieved, the area was cleaned, and bandaids were placed when appropriate.  The patient tolerated the procedure well, and was taken to the  recovery room.    Images were saved to PACS.    Post-procedure pain score: 7/10  Follow-up includes:   -f/u with referring provider    Patience Johnson MD  [facility]

## 2024-11-27 ENCOUNTER — HOSPITAL ENCOUNTER (EMERGENCY)
Facility: CLINIC | Age: 49
Discharge: HOME | End: 2024-11-27
Attending: EMERGENCY MEDICINE
Payer: COMMERCIAL

## 2024-11-27 VITALS
SYSTOLIC BLOOD PRESSURE: 127 MMHG | DIASTOLIC BLOOD PRESSURE: 84 MMHG | TEMPERATURE: 97.9 F | HEIGHT: 71 IN | BODY MASS INDEX: 21.98 KG/M2 | OXYGEN SATURATION: 96 % | HEART RATE: 74 BPM | WEIGHT: 157 LBS | RESPIRATION RATE: 18 BRPM

## 2024-11-27 DIAGNOSIS — M54.42 ACUTE BILATERAL LOW BACK PAIN WITH BILATERAL SCIATICA: ICD-10-CM

## 2024-11-27 DIAGNOSIS — M54.41 ACUTE BILATERAL LOW BACK PAIN WITH BILATERAL SCIATICA: ICD-10-CM

## 2024-11-27 DIAGNOSIS — M54.41 ACUTE RIGHT-SIDED LOW BACK PAIN WITH RIGHT-SIDED SCIATICA: ICD-10-CM

## 2024-11-27 DIAGNOSIS — M51.26 LUMBAR DISC HERNIATION: ICD-10-CM

## 2024-11-27 DIAGNOSIS — M51.362 DEGENERATION OF INTERVERTEBRAL DISC OF LUMBAR REGION WITH DISCOGENIC BACK PAIN AND LOWER EXTREMITY PAIN: ICD-10-CM

## 2024-11-27 PROCEDURE — 99284 EMERGENCY DEPT VISIT MOD MDM: CPT | Performed by: EMERGENCY MEDICINE

## 2024-11-27 RX ORDER — OXYCODONE HYDROCHLORIDE 5 MG/1
5 TABLET ORAL EVERY 6 HOURS PRN
Qty: 15 TABLET | Refills: 0 | Status: ON HOLD | OUTPATIENT
Start: 2024-11-27 | End: 2024-12-03

## 2024-11-27 RX ORDER — CYCLOBENZAPRINE HCL 10 MG
10 TABLET ORAL 3 TIMES DAILY PRN
Qty: 30 TABLET | Refills: 1 | Status: SHIPPED | OUTPATIENT
Start: 2024-11-27

## 2024-11-27 RX ORDER — GABAPENTIN 300 MG/1
CAPSULE ORAL
Qty: 45 CAPSULE | Refills: 0 | Status: SHIPPED | OUTPATIENT
Start: 2024-11-27

## 2024-11-27 ASSESSMENT — COLUMBIA-SUICIDE SEVERITY RATING SCALE - C-SSRS
2. HAVE YOU ACTUALLY HAD ANY THOUGHTS OF KILLING YOURSELF IN THE PAST MONTH?: NO
6. HAVE YOU EVER DONE ANYTHING, STARTED TO DO ANYTHING, OR PREPARED TO DO ANYTHING TO END YOUR LIFE?: NO
1. IN THE PAST MONTH, HAVE YOU WISHED YOU WERE DEAD OR WISHED YOU COULD GO TO SLEEP AND NOT WAKE UP?: NO

## 2024-11-27 ASSESSMENT — ACTIVITIES OF DAILY LIVING (ADL): ADLS_ACUITY_SCORE: 41

## 2024-11-27 NOTE — ED TRIAGE NOTES
Patient reports pain started on Monday, gotten worse over the last few days. Hx of herniated disc. Numbness and intermittent tingling to bilateral LE. No no injury.      Triage Assessment (Adult)       Row Name 11/27/24 0753          Triage Assessment    Airway WDL WDL        Respiratory WDL    Respiratory WDL WDL        Skin Circulation/Temperature WDL    Skin Circulation/Temperature WDL WDL        Cardiac WDL    Cardiac WDL WDL        Peripheral/Neurovascular WDL    Peripheral Neurovascular WDL X;neurovascular assessment lower        LLE Neurovascular Assessment    Sensation LLE numbness present;tingling present        RLE Neurovascular Assessment    Sensation RLE numbness present;tingling present

## 2024-11-27 NOTE — ED PROVIDER NOTES
History     Chief Complaint   Patient presents with    Back Pain     HPI  History per patient, review of [facility] EMR and Care Everywhere EMR, including review of prior lumbar MRI imaging study and his [place] Prescription Drug Monitoring Program (PDMP) history.  Randall Bateman is a 49 year old male with history of lumbar degenerative disc disease with 2 days of atraumatic acute exacerbation of low back pain which radiates to the level of the knees bilaterally, but no further.  He feels that his legs are weak but he is able to ambulate normally.  No sensory deficits, bowel or bladder incontinence, or saddle area anesthesia.  No abdominal pain.  No UTI signs or symptoms or hematuria.  No fever or chills.  He reports good pain relief of similar symptoms with prior epidural steroid injection.  No other acute problems, complaints or concerns.       Previous Records Reviewed:  I reviewed his/her controlled substance prescription medication history in the Prescription Drug Monitoring Program (PDMP): Unremarkable, no prescriptions for opiate analgesics this year    MR LUMBAR SPINE WITHOUT CONTRAST 1/28/2022 10:11 AM     INDICATION: Back and leg pain and weakness. Spinal stenosis, lumbar  region, without neurogenic claudication. Lumbar radiculopathy.     FINDINGS: Nomenclature is based on 5 lumbar type vertebral bodies.  Small nonedematous Schmorl's nodes are present in the lower visualized thoracic spine. No compression fracture deformity. Low-grade retrolisthesis of L1-L5. T2 hyperintense Modic type I edematous degenerative endplate changes are present at L3-L4. No other edema. No pars defect. The conus tip is identified at T12-L1. Unremarkable paraspinal soft tissues.     T12-L1: Normal disc height and signal. No herniation. No facet  arthropathy. No spinal canal or neural foraminal stenosis.         L1-L2: Mild loss of disc height and signal. Mild disc bulge with small  left paracentral extrusion. Mild facet  arthropathy. Mild left lateral  recess narrowing. Otherwise adequate central canal. No foraminal  narrowing.     L2-L3: Mild loss of disc height and signal. Mild disc bulge with small  central extrusion. Mild facet arthropathy. Mild lateral recess  narrowing. Mild right foraminal narrowing. No left foraminal  narrowing.     L3-L4: Normal disc signal. Mild to moderate loss of disc height. Mild  circumferential disc bulge. Large left central and paracentral caudal  disc extrusion extends from the L3-L4 disc along the posterior L4  vertebral body to the level of the left L4-L5 lateral recess. At the  level of the L3-L4 disc space, there is severe left and moderate right  lateral recess narrowing with overall moderate spinal canal stenosis.  Mild facet arthropathy. Low-grade foraminal narrowing.     L4-L5: Mild loss of disc signal. Moderate loss of disc height. Mild  disc bulge. The caudal aspect of the left paracentral L3-L4 disc  extrusion narrows the lateral recess probably affecting the traversing  L5 nerve root. It effaces the entrance to the left L4-L5 foramen,  impinging upon the L4 nerve. Adequate right lateral recess. Mild  spinal canal narrowing. Moderate right foraminal narrowing.     L5-S1: Mild loss of disc signal. Mild to moderate loss of disc height.  Small central protrusion abuts the medial margin of the right greater  than left S1 nerve root sleeves without definitely displacing them.  Otherwise adequate central canal. Mild to moderate left and mild right  facet arthropathy. No right foraminal narrowing. Mild left foraminal  narrowing.                                                            IMPRESSION:  1.  There is a large left paracentral caudal disc extrusion at L3-L4 that impinges upon the left L4 nerve root and probably the left L5 nerve root. The herniation causes moderate spinal canal narrowing at L3-L4 and contributes to mild spinal canal narrowing at L4-L5.  2.  At L4-L5, there is also  "moderate right foraminal narrowing.  3.  At L1-L2, there is mild left lateral recess narrowing.  4.  At L2-L3, there is mild lateral recess and right foraminal narrowing.  5.  At L5-S1, small central disc herniation abuts the S1 nerve root sleeves without definitely displacing them. Mild left foraminal narrowing.    Allergies:  No Known Allergies    Problem List:    There are no active problems to display for this patient.       Past Medical History:    No past medical history on file.    Past Surgical History:    No past surgical history on file.    Family History:    No family history on file.    Social History:  Marital Status:  Single [1]  Social History     Tobacco Use    Smoking status: Every Day    Smokeless tobacco: Never        Medications:    cyclobenzaprine (FLEXERIL) 10 MG tablet  diclofenac (VOLTAREN) 1 % topical gel  gabapentin (NEURONTIN) 300 MG capsule  oxyCODONE (ROXICODONE) 5 MG tablet        Review of Systems  As mentioned in the HPI, in addition focused review of systems was negative.    Physical Exam   BP: 127/84  Pulse: 74  Temp: 97.9  F (36.6  C)  Resp: 18  Height: 180.3 cm (5' 11\")  Weight: 71.2 kg (157 lb)  SpO2: 96 %      Physical Exam  Vitals and nursing note reviewed.   Constitutional:       General: He is not in acute distress.     Appearance: Normal appearance. He is well-developed. He is not ill-appearing or diaphoretic.   Eyes:      General: No scleral icterus.     Extraocular Movements: Extraocular movements intact.      Conjunctiva/sclera: Conjunctivae normal.   Neck:      Trachea: No tracheal deviation.   Cardiovascular:      Rate and Rhythm: Normal rate and regular rhythm.      Pulses: Normal pulses.   Pulmonary:      Effort: Pulmonary effort is normal. No respiratory distress.   Abdominal:      General: There is no distension.      Palpations: Abdomen is soft.      Tenderness: There is no abdominal tenderness. There is no right CVA tenderness or left CVA tenderness. "   Musculoskeletal:      Cervical back: Normal range of motion and neck supple.      Lumbar back: Spasms and tenderness present. No swelling, edema, deformity or bony tenderness. Decreased range of motion.        Back:       Right lower leg: No edema.      Left lower leg: No edema.      Comments: Poorly localized low back pain as diagrammed.  No swelling, rash or lesions.  There is lumbar paraspinous muscular spasm.   Skin:     General: Skin is warm and dry.      Coloration: Skin is not jaundiced or pale.      Findings: No bruising, erythema, lesion or rash.   Neurological:      General: No focal deficit present.      Mental Status: He is alert and oriented to person, place, and time.      Sensory: No sensory deficit.      Motor: No weakness.      Coordination: Coordination normal.      Gait: Gait normal.      Deep Tendon Reflexes: Reflexes normal.   Psychiatric:         Mood and Affect: Mood normal.         Behavior: Behavior normal.       ED Course        Procedures                No results found for this or any previous visit (from the past 24 hours).    Medications - No data to display    He drove himself to the emergency department, opiate analgesic deferred.  He has taken ibuprofen recently and Toradol was deferred.    I strongly considered emergent MRI lumbar spine imaging evaluation but he has no evidence of neurogenic claudication or cauda equina syndrome and I doubt an acute infectious process or emergent neurologic, neurovascular or vascular disease process and I do not feel that emergent imaging evaluation is currently indicated or necessary.  I will refer him for close outpatient follow-up.      Assessments & Plan (with Medical Decision Making)   49 year old male with history of lumbar degenerative disc disease with 2 days of atraumatic acute exacerbation of low back pain which radiates to the level of the knees bilaterally, but no further.  He feels that his legs are weak but he is able to ambulate  normally.  No sensory deficits, bowel or bladder incontinence, or saddle area anesthesia.  He reports good pain relief with the prior epidural steroid injection.  Poorly localized low back pain with no evidence of neurogenic claudication or cauda equina syndrome.  No current indication for emergent imaging evaluation or laboratory evaluation.  He appears to have an acute flare of low back pain due to lumbar degenerative disc disease and H&P. Will Rx oxycodone for pain refractory to Tylenol and ibuprofen, will Rx gabapentin, diclofenac gel and Flexeril.  I asked that he try OTC lidocaine patches.  I made an orthopedic spine  referral for follow-up.  I made a physical therapy referral for follow-up.  He was provided instructions for supportive care and will return as needed for worsened condition or worsening symptoms, or new problems or concerns. No evidence of cauda equina syndrome or neurogenic claudication. I doubt epidural abscess or hematoma, mass or tumor, vertebral osteomyelitis, discitis, septic arthritis, psoas or other muscle abscess, or other emergent disease process. I do not feel there is any current indication for emergent imaging evaluation.     I have reviewed the nursing notes.    I have reviewed the findings, diagnosis, plan and need for follow up with the patient.    New Prescriptions    CYCLOBENZAPRINE (FLEXERIL) 10 MG TABLET    Take 1 tablet (10 mg) by mouth 3 times daily as needed for muscle spasms.    DICLOFENAC (VOLTAREN) 1 % TOPICAL GEL    Apply 4 g topically 4 times daily.    GABAPENTIN (NEURONTIN) 300 MG CAPSULE    Begin with 300 mg p.o. once daily, then increase to 300 mg p.o. twice daily, and then to 300 mg 3 times daily if needed for back pain.    OXYCODONE (ROXICODONE) 5 MG TABLET    Take 1 tablet (5 mg) by mouth every 6 hours as needed for severe pain.       Final diagnoses:   Acute bilateral low back pain with bilateral sciatica   Degeneration of intervertebral disc of lumbar  region with discogenic back pain and lower extremity pain   Lumbar disc herniation       11/27/2024   [facility] EMERGENCY DEPT       Mayur Siddiqi MD  11/28/24 0553

## 2024-11-30 ENCOUNTER — APPOINTMENT (OUTPATIENT)
Dept: MRI IMAGING | Facility: CLINIC | Age: 49
End: 2024-11-30
Attending: EMERGENCY MEDICINE
Payer: COMMERCIAL

## 2024-11-30 ENCOUNTER — HOSPITAL ENCOUNTER (EMERGENCY)
Facility: CLINIC | Age: 49
Discharge: ANOTHER HOSPITAL | End: 2024-11-30
Attending: EMERGENCY MEDICINE | Admitting: EMERGENCY MEDICINE
Payer: COMMERCIAL

## 2024-11-30 ENCOUNTER — HOSPITAL ENCOUNTER (INPATIENT)
Facility: CLINIC | Age: 49
LOS: 3 days | Discharge: HOME HEALTH | End: 2024-12-03
Attending: INTERNAL MEDICINE | Admitting: INTERNAL MEDICINE
Payer: COMMERCIAL

## 2024-11-30 ENCOUNTER — PREP FOR PROCEDURE (OUTPATIENT)
Dept: NEUROLOGY | Facility: CLINIC | Age: 49
End: 2024-11-30

## 2024-11-30 ENCOUNTER — HOSPITAL ENCOUNTER (INPATIENT)
Facility: CLINIC | Age: 49
Setting detail: SURGERY ADMIT
End: 2024-11-30
Attending: NEUROLOGICAL SURGERY | Admitting: NEUROLOGICAL SURGERY
Payer: COMMERCIAL

## 2024-11-30 VITALS
DIASTOLIC BLOOD PRESSURE: 92 MMHG | SYSTOLIC BLOOD PRESSURE: 138 MMHG | WEIGHT: 157 LBS | RESPIRATION RATE: 13 BRPM | HEIGHT: 71 IN | OXYGEN SATURATION: 97 % | TEMPERATURE: 98.5 F | HEART RATE: 93 BPM | BODY MASS INDEX: 21.98 KG/M2

## 2024-11-30 DIAGNOSIS — M51.16 HERNIATION OF LUMBAR INTERVERTEBRAL DISC WITH RADICULOPATHY: Primary | ICD-10-CM

## 2024-11-30 DIAGNOSIS — M48.062 SPINAL STENOSIS OF LUMBAR REGION WITH NEUROGENIC CLAUDICATION: ICD-10-CM

## 2024-11-30 DIAGNOSIS — Z98.890 S/P LUMBAR MICRODISCECTOMY: ICD-10-CM

## 2024-11-30 DIAGNOSIS — M54.16 LUMBAR RADICULOPATHY: Primary | ICD-10-CM

## 2024-11-30 LAB
ALBUMIN UR-MCNC: NEGATIVE MG/DL
ANION GAP SERPL CALCULATED.3IONS-SCNC: 11 MMOL/L (ref 7–15)
APPEARANCE UR: CLEAR
BASOPHILS # BLD AUTO: 0 10E3/UL (ref 0–0.2)
BASOPHILS NFR BLD AUTO: 0 %
BILIRUB UR QL STRIP: NEGATIVE
BUN SERPL-MCNC: 9.1 MG/DL (ref 6–20)
CALCIUM SERPL-MCNC: 9.4 MG/DL (ref 8.8–10.4)
CHLORIDE SERPL-SCNC: 102 MMOL/L (ref 98–107)
COLOR UR AUTO: NORMAL
CREAT SERPL-MCNC: 0.79 MG/DL (ref 0.67–1.17)
EGFRCR SERPLBLD CKD-EPI 2021: >90 ML/MIN/1.73M2
EOSINOPHIL # BLD AUTO: 0.1 10E3/UL (ref 0–0.7)
EOSINOPHIL NFR BLD AUTO: 1 %
ERYTHROCYTE [DISTWIDTH] IN BLOOD BY AUTOMATED COUNT: 12.9 % (ref 10–15)
GLUCOSE SERPL-MCNC: 102 MG/DL (ref 70–99)
GLUCOSE UR STRIP-MCNC: NEGATIVE MG/DL
HCO3 SERPL-SCNC: 24 MMOL/L (ref 22–29)
HCT VFR BLD AUTO: 40.1 % (ref 40–53)
HGB BLD-MCNC: 13.6 G/DL (ref 13.3–17.7)
HGB UR QL STRIP: NEGATIVE
IMM GRANULOCYTES # BLD: 0 10E3/UL
IMM GRANULOCYTES NFR BLD: 0 %
KETONES UR STRIP-MCNC: NEGATIVE MG/DL
LEUKOCYTE ESTERASE UR QL STRIP: NEGATIVE
LYMPHOCYTES # BLD AUTO: 1.6 10E3/UL (ref 0.8–5.3)
LYMPHOCYTES NFR BLD AUTO: 24 %
MCH RBC QN AUTO: 31.1 PG (ref 26.5–33)
MCHC RBC AUTO-ENTMCNC: 33.9 G/DL (ref 31.5–36.5)
MCV RBC AUTO: 92 FL (ref 78–100)
MONOCYTES # BLD AUTO: 0.4 10E3/UL (ref 0–1.3)
MONOCYTES NFR BLD AUTO: 6 %
NEUTROPHILS # BLD AUTO: 4.7 10E3/UL (ref 1.6–8.3)
NEUTROPHILS NFR BLD AUTO: 69 %
NITRATE UR QL: NEGATIVE
PH UR STRIP: 5 [PH] (ref 5–7)
PLATELET # BLD AUTO: 137 10E3/UL (ref 150–450)
POTASSIUM SERPL-SCNC: 4 MMOL/L (ref 3.4–5.3)
RBC # BLD AUTO: 4.37 10E6/UL (ref 4.4–5.9)
SODIUM SERPL-SCNC: 137 MMOL/L (ref 135–145)
SP GR UR STRIP: 1 (ref 1–1.03)
UROBILINOGEN UR STRIP-MCNC: NORMAL MG/DL
WBC # BLD AUTO: 6.8 10E3/UL (ref 4–11)

## 2024-11-30 PROCEDURE — 250N000011 HC RX IP 250 OP 636: Performed by: EMERGENCY MEDICINE

## 2024-11-30 PROCEDURE — 99222 1ST HOSP IP/OBS MODERATE 55: CPT | Performed by: INTERNAL MEDICINE

## 2024-11-30 PROCEDURE — 96374 THER/PROPH/DIAG INJ IV PUSH: CPT | Mod: 59 | Performed by: EMERGENCY MEDICINE

## 2024-11-30 PROCEDURE — 99285 EMERGENCY DEPT VISIT HI MDM: CPT | Performed by: EMERGENCY MEDICINE

## 2024-11-30 PROCEDURE — 36415 COLL VENOUS BLD VENIPUNCTURE: CPT | Performed by: EMERGENCY MEDICINE

## 2024-11-30 PROCEDURE — 96372 THER/PROPH/DIAG INJ SC/IM: CPT | Mod: XS | Performed by: EMERGENCY MEDICINE

## 2024-11-30 PROCEDURE — 51702 INSERT TEMP BLADDER CATH: CPT | Performed by: EMERGENCY MEDICINE

## 2024-11-30 PROCEDURE — 85025 COMPLETE CBC W/AUTO DIFF WBC: CPT | Performed by: EMERGENCY MEDICINE

## 2024-11-30 PROCEDURE — 250N000009 HC RX 250: Mod: JW | Performed by: EMERGENCY MEDICINE

## 2024-11-30 PROCEDURE — 51798 US URINE CAPACITY MEASURE: CPT | Performed by: EMERGENCY MEDICINE

## 2024-11-30 PROCEDURE — 81003 URINALYSIS AUTO W/O SCOPE: CPT | Performed by: EMERGENCY MEDICINE

## 2024-11-30 PROCEDURE — 80048 BASIC METABOLIC PNL TOTAL CA: CPT | Performed by: EMERGENCY MEDICINE

## 2024-11-30 PROCEDURE — 72148 MRI LUMBAR SPINE W/O DYE: CPT

## 2024-11-30 PROCEDURE — 120N000001 HC R&B MED SURG/OB

## 2024-11-30 PROCEDURE — 99285 EMERGENCY DEPT VISIT HI MDM: CPT | Mod: 25 | Performed by: EMERGENCY MEDICINE

## 2024-11-30 PROCEDURE — 96375 TX/PRO/DX INJ NEW DRUG ADDON: CPT | Mod: 59 | Performed by: EMERGENCY MEDICINE

## 2024-11-30 PROCEDURE — 250N000011 HC RX IP 250 OP 636: Performed by: INTERNAL MEDICINE

## 2024-11-30 RX ORDER — HYDROMORPHONE HYDROCHLORIDE 1 MG/ML
0.5 INJECTION, SOLUTION INTRAMUSCULAR; INTRAVENOUS; SUBCUTANEOUS ONCE
Status: COMPLETED | OUTPATIENT
Start: 2024-11-30 | End: 2024-11-30

## 2024-11-30 RX ORDER — LIDOCAINE 40 MG/G
CREAM TOPICAL
Status: DISCONTINUED | OUTPATIENT
Start: 2024-11-30 | End: 2024-12-03 | Stop reason: HOSPADM

## 2024-11-30 RX ORDER — NALOXONE HYDROCHLORIDE 0.4 MG/ML
0.4 INJECTION, SOLUTION INTRAMUSCULAR; INTRAVENOUS; SUBCUTANEOUS
Status: DISCONTINUED | OUTPATIENT
Start: 2024-11-30 | End: 2024-12-03 | Stop reason: HOSPADM

## 2024-11-30 RX ORDER — ONDANSETRON 4 MG/1
4 TABLET, ORALLY DISINTEGRATING ORAL EVERY 6 HOURS PRN
Status: DISCONTINUED | OUTPATIENT
Start: 2024-11-30 | End: 2024-12-01

## 2024-11-30 RX ORDER — POLYETHYLENE GLYCOL 3350 17 G/17G
17 POWDER, FOR SOLUTION ORAL DAILY
Status: DISCONTINUED | OUTPATIENT
Start: 2024-12-01 | End: 2024-12-03

## 2024-11-30 RX ORDER — NALOXONE HYDROCHLORIDE 0.4 MG/ML
0.2 INJECTION, SOLUTION INTRAMUSCULAR; INTRAVENOUS; SUBCUTANEOUS
Status: DISCONTINUED | OUTPATIENT
Start: 2024-11-30 | End: 2024-12-03 | Stop reason: HOSPADM

## 2024-11-30 RX ORDER — AMOXICILLIN 250 MG
2 CAPSULE ORAL 2 TIMES DAILY PRN
Status: DISCONTINUED | OUTPATIENT
Start: 2024-11-30 | End: 2024-12-03 | Stop reason: HOSPADM

## 2024-11-30 RX ORDER — OXYCODONE HYDROCHLORIDE 5 MG/1
5 TABLET ORAL EVERY 4 HOURS PRN
Status: DISCONTINUED | OUTPATIENT
Start: 2024-11-30 | End: 2024-12-01 | Stop reason: DRUGHIGH

## 2024-11-30 RX ORDER — HYDROMORPHONE HCL IN WATER/PF 6 MG/30 ML
0.4 PATIENT CONTROLLED ANALGESIA SYRINGE INTRAVENOUS
Status: DISCONTINUED | OUTPATIENT
Start: 2024-11-30 | End: 2024-12-01

## 2024-11-30 RX ORDER — ACETAMINOPHEN 325 MG/1
650 TABLET ORAL EVERY 4 HOURS PRN
Status: DISCONTINUED | OUTPATIENT
Start: 2024-11-30 | End: 2024-12-03 | Stop reason: HOSPADM

## 2024-11-30 RX ORDER — HYDROMORPHONE HCL IN WATER/PF 6 MG/30 ML
0.2 PATIENT CONTROLLED ANALGESIA SYRINGE INTRAVENOUS
Status: DISCONTINUED | OUTPATIENT
Start: 2024-11-30 | End: 2024-12-01

## 2024-11-30 RX ORDER — AMOXICILLIN 250 MG
1 CAPSULE ORAL 2 TIMES DAILY PRN
Status: DISCONTINUED | OUTPATIENT
Start: 2024-11-30 | End: 2024-12-03 | Stop reason: HOSPADM

## 2024-11-30 RX ORDER — CALCIUM CARBONATE 500 MG/1
1000 TABLET, CHEWABLE ORAL 4 TIMES DAILY PRN
Status: DISCONTINUED | OUTPATIENT
Start: 2024-11-30 | End: 2024-12-01

## 2024-11-30 RX ORDER — ONDANSETRON 2 MG/ML
4 INJECTION INTRAMUSCULAR; INTRAVENOUS EVERY 6 HOURS PRN
Status: DISCONTINUED | OUTPATIENT
Start: 2024-11-30 | End: 2024-12-01

## 2024-11-30 RX ORDER — ACETAMINOPHEN 650 MG/1
650 SUPPOSITORY RECTAL EVERY 4 HOURS PRN
Status: DISCONTINUED | OUTPATIENT
Start: 2024-11-30 | End: 2024-12-03 | Stop reason: HOSPADM

## 2024-11-30 RX ORDER — GABAPENTIN 100 MG/1
100 CAPSULE ORAL 3 TIMES DAILY
Status: DISCONTINUED | OUTPATIENT
Start: 2024-11-30 | End: 2024-12-01 | Stop reason: DRUGHIGH

## 2024-11-30 RX ADMIN — HYDROMORPHONE HYDROCHLORIDE 1 MG: 1 INJECTION, SOLUTION INTRAMUSCULAR; INTRAVENOUS; SUBCUTANEOUS at 15:22

## 2024-11-30 RX ADMIN — HYDROMORPHONE HYDROCHLORIDE 0.5 MG: 1 INJECTION, SOLUTION INTRAMUSCULAR; INTRAVENOUS; SUBCUTANEOUS at 20:49

## 2024-11-30 RX ADMIN — Medication 75 MG: at 16:57

## 2024-11-30 RX ADMIN — MIDAZOLAM HYDROCHLORIDE 3 MG: 1 INJECTION, SOLUTION INTRAMUSCULAR; INTRAVENOUS at 16:38

## 2024-11-30 RX ADMIN — HYDROMORPHONE HYDROCHLORIDE 0.4 MG: 0.2 INJECTION, SOLUTION INTRAMUSCULAR; INTRAVENOUS; SUBCUTANEOUS at 23:10

## 2024-11-30 ASSESSMENT — ACTIVITIES OF DAILY LIVING (ADL)
ADLS_ACUITY_SCORE: 44
ADLS_ACUITY_SCORE: 41
ADLS_ACUITY_SCORE: 43
ADLS_ACUITY_SCORE: 41
ADLS_ACUITY_SCORE: 43

## 2024-11-30 NOTE — ED NOTES
"Patient was yelling out and wife came out of room stating her  needed assistance sitting up. Dr Siddiqi was in speaking with patient and trying to help patient and asked if I could step in to help. I did and rt away patient is raising his voice and said \"You fucking people\" \"I can't , I can't lay here like this. He proceeded to try to sit up as wife helping and he started barking orders to me to help and he just can't lay there. He needs help up to sit on side of bed and dangle feet. Because of the swearing and the yelling and barking order's at me I told him there was no reason to be rude. He apologized and so did his wife and she said he has been so uncomfortable all day, it has been a long day. And patient stated \"You have no idea just how much pain I am sucking up.  "

## 2024-11-30 NOTE — ED NOTES
RN went to MRI with patient for close monitoring.  Pt was on 3L O2 via n/c for comfort.  RN stayed in MRI room with patient.  Pt tolerated MRI and returned to department.  Placed on monitoring in department.  Sister remains at bedside.  Pt resting comfortably upon RN leaving room, A&Ox4.  VSS.

## 2024-11-30 NOTE — ED TRIAGE NOTES
Arrives from home concerned for ongoing/worsening back pain that radiates into both legs along with numbness/tingling shooting to feet. Denies new numbness/tingling to perineum or bladder or bowel issues. Denies injury-was told he had herniated discs years ago & declined surgery-had steroid injection instead & had been doing fine up until last time seen in ED.     Triage Assessment (Adult)       Row Name 11/30/24 1176          Triage Assessment    Airway WDL WDL        Respiratory WDL    Respiratory WDL WDL        Skin Circulation/Temperature WDL    Skin Circulation/Temperature WDL WDL        Cardiac WDL    Cardiac WDL WDL        Peripheral/Neurovascular WDL    Peripheral Neurovascular WDL WDL        Cognitive/Neuro/Behavioral WDL    Cognitive/Neuro/Behavioral WDL WDL

## 2024-11-30 NOTE — ED PROVIDER NOTES
"  History     Chief Complaint   Patient presents with    Back Pain     HPI  History per patient, review of [facility] EMR and Care Everywhere EMR.  Randall Bateman is a 49 year old male with a history of lumbar degenerative disc disease and HNP who developed atraumatic diffuse low back pain 5 days ago and was seen in the emergency department 3 days ago with diffuse poorly localized low back pain with pain radiating to the right upper leg to the level of the knee, but no further, and was found to be neurologically intact and without evidence of cauda equina syndrome or neurogenic claudication.  He presents today because yesterday morning, over 24 hours ago, he developed right leg weakness which has progressed to the point where he is now able to use the right leg or bear weight or ambulate with it.  He reports right leg is \"floppy\" and he cannot move it, bear weight or ambulate.  He called his sister this morning who brought him to the ED.  He states that 2 days ago the right leg radicular symptoms progressed to the right lower leg beyond the right knee and right lateral foot. No bowel or bladder incontinence. No saddle area anesthesia. No fever or chills.   He was discharged with instructions for supportive care, a spine referral (he has an appointment 12/13/2024) and physical therapy referral were made for him and he was discharged with prescription for oxycodone, Flexeril, gabapentin and diclofenac gel.    Previous Records Reviewed: Last lumbar MRI January 2022.  MR LUMBAR SPINE WITHOUT CONTRAST 1/28/2022     INDICATION: Back and leg pain and weakness. Spinal stenosis, lumbar region, without neurogenic claudication. Lumbar radiculopathy.     FINDINGS: Nomenclature is based on 5 lumbar type vertebral bodies.  Small nonedematous Schmorl's nodes are present in the lower visualized thoracic spine. No compression fracture deformity. Low-grade retrolisthesis of L1-L5. T2 hyperintense Modic type I edematous degenerative " endplate changes are present at L3-L4. No other edema. No pars defect. The conus tip is identified at T12-L1. Unremarkable paraspinal soft tissues.     T12-L1: Normal disc height and signal. No herniation. No facet arthropathy. No spinal canal or neural foraminal stenosis.         L1-L2: Mild loss of disc height and signal. Mild disc bulge with small  left paracentral extrusion. Mild facet arthropathy. Mild left lateral  recess narrowing. Otherwise adequate central canal. No foraminal  narrowing.     L2-L3: Mild loss of disc height and signal. Mild disc bulge with small central extrusion. Mild facet arthropathy. Mild lateral recess  narrowing. Mild right foraminal narrowing. No left foraminal narrowing.     L3-L4: Normal disc signal. Mild to moderate loss of disc height. Mild  circumferential disc bulge. Large left central and paracentral caudal  disc extrusion extends from the L3-L4 disc along the posterior L4  vertebral body to the level of the left L4-L5 lateral recess. At the  level of the L3-L4 disc space, there is severe left and moderate right  lateral recess narrowing with overall moderate spinal canal stenosis.  Mild facet arthropathy. Low-grade foraminal narrowing.     L4-L5: Mild loss of disc signal. Moderate loss of disc height. Mild  disc bulge. The caudal aspect of the left paracentral L3-L4 disc  extrusion narrows the lateral recess probably affecting the traversing  L5 nerve root. It effaces the entrance to the left L4-L5 foramen,  impinging upon the L4 nerve. Adequate right lateral recess. Mild  spinal canal narrowing. Moderate right foraminal narrowing.     L5-S1: Mild loss of disc signal. Mild to moderate loss of disc height.  Small central protrusion abuts the medial margin of the right greater  than left S1 nerve root sleeves without definitely displacing them.  Otherwise adequate central canal. Mild to moderate left and mild right  facet arthropathy. No right foraminal narrowing. Mild left  "foraminal narrowing.                                                            IMPRESSION:  1.  There is a large left paracentral caudal disc extrusion at L3-L4 that impinges upon the left L4 nerve root and probably the left L5 nerve root. The herniation causes moderate spinal canal narrowing at L3-L4 and contributes to mild spinal canal narrowing at L4-L5.  2.  At L4-L5, there is also moderate right foraminal narrowing.  3.  At L1-L2, there is mild left lateral recess narrowing.  4.  At L2-L3, there is mild lateral recess and right foraminal narrowing.  5.  At L5-S1, small central disc herniation abuts the S1 nerve root sleeves without definitely displacing them. Mild left foraminal narrowing.    Allergies:  No Known Allergies    Problem List:    There are no active problems to display for this patient.       Past Medical History:    History reviewed. No pertinent past medical history.    Past Surgical History:    History reviewed. No pertinent surgical history.    Family History:    History reviewed. No pertinent family history.    Social History:  Marital Status:  Single [1]  Social History     Tobacco Use    Smoking status: Every Day    Smokeless tobacco: Never        Medications:    cyclobenzaprine (FLEXERIL) 10 MG tablet  diclofenac (VOLTAREN) 1 % topical gel  gabapentin (NEURONTIN) 300 MG capsule  oxyCODONE (ROXICODONE) 5 MG tablet        Review of Systems  As mentioned in the HPI, in addition focused review of systems was negative.    Physical Exam   BP: (!) 159/105  Pulse: 113  Temp: 98.5  F (36.9  C)  Resp: 18  Height: 180.3 cm (5' 11\")  Weight: 71.2 kg (157 lb)  SpO2: 96 %      Physical Exam  Vitals and nursing note reviewed.   Constitutional:       General: He is in acute distress.      Appearance: Normal appearance. He is well-developed. He is not ill-appearing or diaphoretic.   HENT:      Head: Normocephalic and atraumatic.      Right Ear: External ear normal.      Left Ear: External ear normal.      " Nose: Nose normal.      Mouth/Throat:      Mouth: Mucous membranes are moist.   Eyes:      General: No scleral icterus.     Extraocular Movements: Extraocular movements intact.      Conjunctiva/sclera: Conjunctivae normal.   Neck:      Trachea: No tracheal deviation.   Cardiovascular:      Rate and Rhythm: Normal rate and regular rhythm.      Heart sounds: Normal heart sounds. No murmur heard.     No friction rub. No gallop.   Pulmonary:      Effort: Pulmonary effort is normal. No respiratory distress.      Breath sounds: Normal breath sounds. No wheezing, rhonchi or rales.   Genitourinary:     Comments: Rectal exam: No perianal/saddle anesthesia.  Rectal sphincter tone: ? weak  Musculoskeletal:         General: Normal range of motion.      Cervical back: Normal range of motion and neck supple.        Back:       Right lower leg: No edema.      Left lower leg: No edema.      Comments: Denies back pain while seated on the bed at rest. He has left low back pain when he tries to flex the right hip.   Skin:     General: Skin is warm and dry.      Coloration: Skin is not pale.      Findings: No erythema or rash.   Neurological:      General: No focal deficit present.      Mental Status: He is alert and oriented to person, place, and time.      Sensory: Sensory deficit (stocking glove right leg paresthesia) present.      Motor: Weakness (Weakness of the right hip and right knee.  No ankle movement with attempt to plantarflex and dorsiflex.  Able to wiggle his toes.) present.      Deep Tendon Reflexes: Reflexes abnormal (right knee and ankle reflexes absent).   Psychiatric:         Behavior: Behavior normal.      Comments: Anxious affect, oppositional and angry.         ED Course        Procedures                Results for orders placed or performed during the hospital encounter of 11/30/24 (from the past 24 hours)   Lumbar spine MRI w/o contrast    Narrative    EXAM: MR LUMBAR SPINE W/O CONTRAST  LOCATION: [facility]  [facility]  DATE: 11/30/2024    INDICATION: Diffuse right leg weakness, history of lumbar DDD and HNP  COMPARISON: January 28, 2022.  TECHNIQUE: Routine Lumbar Spine MRI without IV contrast.    FINDINGS:   Motion degraded exam. The 12th ribs are not well visualized, likely secondary to motion. The last well-formed disc space will be designated L5-S1 in keeping with prior nomenclature. Straightening of the normal lumbar lordosis. Retrolisthesis L2 on L3   measuring 3 mm grade vertebral body heights are maintained. No acute anterior compression deformity or pathologic marrow signal abnormality. Normal distal spinal cord and cauda equina with conus medullaris at T12-L1 level. Markedly distended urinary   bladder. Unremarkable visualized bony pelvis.    T12-L1: Normal disc height and signal. No herniation. Normal facets. No spinal canal or neural foraminal stenosis.     L1-L2: Disc desiccation with minimal disc height loss. Minimal disc bulge. No spinal canal stenosis. No severe foraminal stenosis.    L2-L3: Disc desiccation with moderate to advanced disc height loss. Disc bulge with superimposed large central disc protrusion produces severe spinal canal stenosis with complete effacement of all CSF spaces. There is no significant neural foraminal   stenosis.     L3-L4: Disc desiccation with moderate disc height loss. Disc bulge and mild facet degenerative change are present. Previously noted disc extrusion is resolved. There is no significant spinal canal stenosis. Probable moderate foraminal stenosis.    L4-L5: Disc desiccation with moderate disc height loss. Disc bulge with superimposed left central disc protrusion. Moderate facet degeneration. There is no spinal canal stenosis. Effacement of the left lateral recess. Suspect impingement of the left L5   nerve root. Moderate bilateral foraminal stenosis.    L5-S1: Disc desiccation with moderate disc height loss. Shallow disc bulge. Mild facet  degeneration. No high-grade spinal canal or foraminal stenosis.      Impression    IMPRESSION:  1.  Significantly motion degraded exam with nonvisualized 12th ribs. In keeping with previous nomenclature the last well-formed disc space will be considered L5-S1.  2.  L2-3 disc bulge and large central disc protrusion produce severe spinal canal stenosis with complete effacement of all CSF spaces.   3.  Severe spinal canal stenosis at L2-3 and markedly distended urinary bladder concerning for urinary retention. Clinical correlation for potential urinary retention and cauda equina syndrome is recommended.  4.  Moderate foraminal stenosis L3-4 and L4-5.  5.  Severe lateral recess stenosis left at L4-5. Suspect impingement of the left L5 nerve root.      These findings were discussed with DR. CHAYITO BAEZ at 5:55 PM CST on November 30, 2024   CBC with platelets, differential    Narrative    The following orders were created for panel order CBC with platelets, differential.  Procedure                               Abnormality         Status                     ---------                               -----------         ------                     CBC with platelets and d...[[id number]]  Abnormal            Final result                 Please view results for these tests on the individual orders.   Basic metabolic panel   Result Value Ref Range    Sodium 137 135 - 145 mmol/L    Potassium 4.0 3.4 - 5.3 mmol/L    Chloride 102 98 - 107 mmol/L    Carbon Dioxide (CO2) 24 22 - 29 mmol/L    Anion Gap 11 7 - 15 mmol/L    Urea Nitrogen 9.1 6.0 - 20.0 mg/dL    Creatinine 0.79 0.67 - 1.17 mg/dL    GFR Estimate >90 >60 mL/min/1.73m2    Calcium 9.4 8.8 - 10.4 mg/dL    Glucose 102 (H) 70 - 99 mg/dL   CBC with platelets and differential   Result Value Ref Range    WBC Count 6.8 4.0 - 11.0 10e3/uL    RBC Count 4.37 (L) 4.40 - 5.90 10e6/uL    Hemoglobin 13.6 13.3 - 17.7 g/dL    Hematocrit 40.1 40.0 - 53.0 %    MCV 92 78 - 100 fL    MCH 31.1  26.5 - 33.0 pg    MCHC 33.9 31.5 - 36.5 g/dL    RDW 12.9 10.0 - 15.0 %    Platelet Count 137 (L) 150 - 450 10e3/uL    % Neutrophils 69 %    % Lymphocytes 24 %    % Monocytes 6 %    % Eosinophils 1 %    % Basophils 0 %    % Immature Granulocytes 0 %    Absolute Neutrophils 4.7 1.6 - 8.3 10e3/uL    Absolute Lymphocytes 1.6 0.8 - 5.3 10e3/uL    Absolute Monocytes 0.4 0.0 - 1.3 10e3/uL    Absolute Eosinophils 0.1 0.0 - 0.7 10e3/uL    Absolute Basophils 0.0 0.0 - 0.2 10e3/uL    Absolute Immature Granulocytes 0.0 <=0.4 10e3/uL   UA Macroscopic with reflex to Microscopic and Culture    Specimen: Urine, Catheter   Result Value Ref Range    Color Urine Straw Colorless, Straw, Light Yellow, Yellow    Appearance Urine Clear Clear    Glucose Urine Negative Negative mg/dL    Bilirubin Urine Negative Negative    Ketones Urine Negative Negative mg/dL    Specific Gravity Urine 1.004 1.003 - 1.035    Blood Urine Negative Negative    pH Urine 5.0 5.0 - 7.0    Protein Albumin Urine Negative Negative mg/dL    Urobilinogen Urine Normal Normal, 2.0 mg/dL    Nitrite Urine Negative Negative    Leukocyte Esterase Urine Negative Negative    Narrative    Microscopic not indicated       Medications   HYDROmorphone (DILAUDID) injection 1 mg (1 mg Intramuscular $Given 11/30/24 1522)   midazolam (VERSED) injection 3 mg (3 mg Intravenous $Given 11/30/24 1638)   ketamine (KETALAR) injection  mg (75 mg Intravenous $Given 11/30/24 1657)       When I examined him prior to ordering an MRI he reported no pain, unless he tried to lift his right leg which caused relatively mild left low back pain.  He declined an analgesic.  He was sent for MRI and I was contacted by MRI stating that he had severe diffuse low back pain with lying supine and is unable to perform the MRI study.  Will give Dilaudid 1 mg IM.    3:37 PM - He was given Dilaudid 1 mg IM and I was contacted by the MRI technician who reports he is still unable to lie supine for the MRI study  "due to back pain with laying supine.    4:24 PM - Despite Dilaudid 1 mg IM he still was unable to lie supine for MRI imaging evaluation.  Will place IV and try to obtain adequate analgesia and sedation to facilitate an emergent MRI evaluation today.  He again is very angry and reports \"I need answers and will go to the [facility] if I have to\".  I reassured him that we are working towards getting his MRI to further evaluate him and determine the cause of his symptoms and rule out emergent disease processes for him.  He is willing to stay here for continued evaluation. When questioned he reports he just urinated normally, voided normally and without difficulty and fully emptied his bladder prior to going to MRI.    4:58 PM -still having difficulty lying supine on the examination bed after Versed 3 mg IV.  Will try ketamine IV.    After adequate sedation with IV Versed and IV ketamine, after receiving Dilaudid IM, MRI was able to be performed. Awaiting MRI results.    5:55 PM - I reviewed the MRI results with the interpreting Radiologist.  He has severe spinal canal stenosis at L2-L3 with no visible CSF and effacement of the canal with compression of the nerve roots at this level, and a very distended bladder concerning for urinary retention.  Will consult Neurosurgery and check a postvoid bladder scan.    Postvoid bladder scan >999 ml. Mobley catheter was placed without difficulty with return of 1100 mL.    6:53 PM - Awaiting Neurosurgery service consultation.    7:18 PM - I reviewed the case and consulted with Killian Reese, Neurosurgery service on-call. He felt patient could be transferred to the [facility] team with Neurosurgery consulting.    7:31 PM - I reviewed the case and consulted Dr. Parrish, admitting physician at [facility]. He accepted care of the patient upon transfer there and he will speak with the Neurosurgery service regarding the transfer and need for emergent evaluation when he " arrives there.      Assessments & Plan (with Medical Decision Making)   49-year-old male with history of lumbar DDD and HNP who had been doing well for couple years until insidious onset of atraumatic low back pain radiating to the right upper leg in the past week.  He was seen in the ED 3 days ago and had a benign exam, was neurologically intact and without evidence of cauda equina syndrome or neurogenic claudication.  He was discharged with instructions for supportive care, a spine referral (he has an appointment 12/13/2024) and physical therapy referral were made for him and he was discharged with prescription for oxycodone, Flexeril, gabapentin and diclofenac gel.  He states that since he was seen in the ED 3 days ago he developed pain radiating to the right lower leg and lateral foot and then yesterday developed diffuse right leg weakness rendering him unable to bear weight on his right leg.  He has right leg weakness, loss of RLE DTRs and MRI showing a full bladder and severe spinal stenosis at L2-L3 concerning for cauda equina syndrome.  Despite his insistence that he was emptying his bladder normally and fully, including prior to his MRI, bladder scan showed over 999 mL (max), and placement of a velásquez catheter returned well over a liter of urine. The Neurosurgery service was consulted and will see the patient after arrival to [facility].  Dr. Parrish, admitting physician, accepted care of the patient in transfer there.    I have reviewed the nursing notes.    I have reviewed the findings, diagnosis, plan and need for follow up with the patient.    Medical Decision Making: High complexity      New Prescriptions    No medications on file       Final diagnoses:   Spinal stenosis of lumbar region with neurogenic claudication       11/30/2024   [facility] EMERGENCY DEPT       Mayur Siddiqi MD  11/30/24 1942       Mayur Siddiqi MD  11/30/24 2047

## 2024-12-01 ENCOUNTER — ANESTHESIA EVENT (OUTPATIENT)
Dept: SURGERY | Facility: CLINIC | Age: 49
End: 2024-12-01
Payer: COMMERCIAL

## 2024-12-01 ENCOUNTER — ANESTHESIA (OUTPATIENT)
Dept: SURGERY | Facility: CLINIC | Age: 49
End: 2024-12-01
Payer: COMMERCIAL

## 2024-12-01 ENCOUNTER — APPOINTMENT (OUTPATIENT)
Dept: GENERAL RADIOLOGY | Facility: CLINIC | Age: 49
End: 2024-12-01
Attending: NEUROLOGICAL SURGERY
Payer: COMMERCIAL

## 2024-12-01 LAB
ALBUMIN SERPL BCG-MCNC: 4.6 G/DL (ref 3.5–5.2)
ALP SERPL-CCNC: 42 U/L (ref 40–150)
ALT SERPL W P-5'-P-CCNC: 15 U/L (ref 0–70)
ANION GAP SERPL CALCULATED.3IONS-SCNC: 14 MMOL/L (ref 7–15)
AST SERPL W P-5'-P-CCNC: 25 U/L (ref 0–45)
BASOPHILS # BLD AUTO: 0 10E3/UL (ref 0–0.2)
BASOPHILS NFR BLD AUTO: 0 %
BILIRUB SERPL-MCNC: 0.5 MG/DL
BUN SERPL-MCNC: 13.7 MG/DL (ref 6–20)
CALCIUM SERPL-MCNC: 9.4 MG/DL (ref 8.8–10.4)
CHLORIDE SERPL-SCNC: 99 MMOL/L (ref 98–107)
CREAT SERPL-MCNC: 0.89 MG/DL (ref 0.67–1.17)
EGFRCR SERPLBLD CKD-EPI 2021: >90 ML/MIN/1.73M2
EOSINOPHIL # BLD AUTO: 0.1 10E3/UL (ref 0–0.7)
EOSINOPHIL NFR BLD AUTO: 1 %
ERYTHROCYTE [DISTWIDTH] IN BLOOD BY AUTOMATED COUNT: 13 % (ref 10–15)
GLUCOSE SERPL-MCNC: 94 MG/DL (ref 70–99)
HCO3 SERPL-SCNC: 25 MMOL/L (ref 22–29)
HCT VFR BLD AUTO: 41.2 % (ref 40–53)
HGB BLD-MCNC: 13.5 G/DL (ref 13.3–17.7)
IMM GRANULOCYTES # BLD: 0 10E3/UL
IMM GRANULOCYTES NFR BLD: 0 %
LYMPHOCYTES # BLD AUTO: 1.4 10E3/UL (ref 0.8–5.3)
LYMPHOCYTES NFR BLD AUTO: 18 %
MCH RBC QN AUTO: 30.3 PG (ref 26.5–33)
MCHC RBC AUTO-ENTMCNC: 32.8 G/DL (ref 31.5–36.5)
MCV RBC AUTO: 93 FL (ref 78–100)
MONOCYTES # BLD AUTO: 0.6 10E3/UL (ref 0–1.3)
MONOCYTES NFR BLD AUTO: 7 %
NEUTROPHILS # BLD AUTO: 5.9 10E3/UL (ref 1.6–8.3)
NEUTROPHILS NFR BLD AUTO: 74 %
NRBC # BLD AUTO: 0 10E3/UL
NRBC BLD AUTO-RTO: 0 /100
PLATELET # BLD AUTO: 135 10E3/UL (ref 150–450)
POTASSIUM SERPL-SCNC: 4 MMOL/L (ref 3.4–5.3)
PROT SERPL-MCNC: 7.1 G/DL (ref 6.4–8.3)
RBC # BLD AUTO: 4.45 10E6/UL (ref 4.4–5.9)
SODIUM SERPL-SCNC: 138 MMOL/L (ref 135–145)
WBC # BLD AUTO: 8 10E3/UL (ref 4–11)

## 2024-12-01 PROCEDURE — 36415 COLL VENOUS BLD VENIPUNCTURE: CPT | Performed by: INTERNAL MEDICINE

## 2024-12-01 PROCEDURE — 99232 SBSQ HOSP IP/OBS MODERATE 35: CPT | Performed by: HOSPITALIST

## 2024-12-01 PROCEDURE — 272N000001 HC OR GENERAL SUPPLY STERILE: Performed by: NEUROLOGICAL SURGERY

## 2024-12-01 PROCEDURE — 360N000084 HC SURGERY LEVEL 4 W/ FLUORO, PER MIN: Performed by: NEUROLOGICAL SURGERY

## 2024-12-01 PROCEDURE — 00NY0ZZ RELEASE LUMBAR SPINAL CORD, OPEN APPROACH: ICD-10-PCS | Performed by: NEUROLOGICAL SURGERY

## 2024-12-01 PROCEDURE — 258N000003 HC RX IP 258 OP 636: Performed by: ANESTHESIOLOGY

## 2024-12-01 PROCEDURE — 63030 LAMOT DCMPRN NRV RT 1 LMBR: CPT | Mod: RT | Performed by: NEUROLOGICAL SURGERY

## 2024-12-01 PROCEDURE — 80051 ELECTROLYTE PANEL: CPT | Performed by: INTERNAL MEDICINE

## 2024-12-01 PROCEDURE — 63030 LAMOT DCMPRN NRV RT 1 LMBR: CPT | Performed by: ANESTHESIOLOGY

## 2024-12-01 PROCEDURE — 250N000011 HC RX IP 250 OP 636: Performed by: NURSE ANESTHETIST, CERTIFIED REGISTERED

## 2024-12-01 PROCEDURE — 999N000141 HC STATISTIC PRE-PROCEDURE NURSING ASSESSMENT: Performed by: NEUROLOGICAL SURGERY

## 2024-12-01 PROCEDURE — 999N000179 XR SURGERY CARM FLUORO LESS THAN 5 MIN W STILLS

## 2024-12-01 PROCEDURE — 250N000009 HC RX 250

## 2024-12-01 PROCEDURE — 120N000001 HC R&B MED SURG/OB

## 2024-12-01 PROCEDURE — 250N000009 HC RX 250: Performed by: NEUROLOGICAL SURGERY

## 2024-12-01 PROCEDURE — 63030 LAMOT DCMPRN NRV RT 1 LMBR: CPT

## 2024-12-01 PROCEDURE — 710N000009 HC RECOVERY PHASE 1, LEVEL 1, PER MIN: Performed by: NEUROLOGICAL SURGERY

## 2024-12-01 PROCEDURE — 82040 ASSAY OF SERUM ALBUMIN: CPT | Performed by: INTERNAL MEDICINE

## 2024-12-01 PROCEDURE — 250N000025 HC SEVOFLURANE, PER MIN: Performed by: NEUROLOGICAL SURGERY

## 2024-12-01 PROCEDURE — 258N000003 HC RX IP 258 OP 636

## 2024-12-01 PROCEDURE — 250N000011 HC RX IP 250 OP 636

## 2024-12-01 PROCEDURE — 370N000017 HC ANESTHESIA TECHNICAL FEE, PER MIN: Performed by: NEUROLOGICAL SURGERY

## 2024-12-01 PROCEDURE — 85025 COMPLETE CBC W/AUTO DIFF WBC: CPT | Performed by: INTERNAL MEDICINE

## 2024-12-01 PROCEDURE — 250N000011 HC RX IP 250 OP 636: Performed by: INTERNAL MEDICINE

## 2024-12-01 PROCEDURE — 250N000013 HC RX MED GY IP 250 OP 250 PS 637: Performed by: INTERNAL MEDICINE

## 2024-12-01 PROCEDURE — 250N000011 HC RX IP 250 OP 636: Performed by: NEUROLOGICAL SURGERY

## 2024-12-01 PROCEDURE — 250N000013 HC RX MED GY IP 250 OP 250 PS 637: Performed by: PHYSICIAN ASSISTANT

## 2024-12-01 RX ORDER — PROPOFOL 10 MG/ML
INJECTION, EMULSION INTRAVENOUS PRN
Status: DISCONTINUED | OUTPATIENT
Start: 2024-12-01 | End: 2024-12-01

## 2024-12-01 RX ORDER — DEXAMETHASONE SODIUM PHOSPHATE 4 MG/ML
4 INJECTION, SOLUTION INTRA-ARTICULAR; INTRALESIONAL; INTRAMUSCULAR; INTRAVENOUS; SOFT TISSUE
Status: DISCONTINUED | OUTPATIENT
Start: 2024-12-01 | End: 2024-12-01 | Stop reason: HOSPADM

## 2024-12-01 RX ORDER — ONDANSETRON 2 MG/ML
4 INJECTION INTRAMUSCULAR; INTRAVENOUS EVERY 6 HOURS PRN
Status: DISCONTINUED | OUTPATIENT
Start: 2024-12-01 | End: 2024-12-03 | Stop reason: HOSPADM

## 2024-12-01 RX ORDER — OXYCODONE HYDROCHLORIDE 5 MG/1
5 TABLET ORAL EVERY 4 HOURS PRN
Status: DISCONTINUED | OUTPATIENT
Start: 2024-12-01 | End: 2024-12-03 | Stop reason: HOSPADM

## 2024-12-01 RX ORDER — METHOCARBAMOL 500 MG/1
500 TABLET, FILM COATED ORAL 4 TIMES DAILY
Status: DISCONTINUED | OUTPATIENT
Start: 2024-12-01 | End: 2024-12-03 | Stop reason: HOSPADM

## 2024-12-01 RX ORDER — FENTANYL CITRATE 50 UG/ML
INJECTION, SOLUTION INTRAMUSCULAR; INTRAVENOUS PRN
Status: DISCONTINUED | OUTPATIENT
Start: 2024-12-01 | End: 2024-12-01

## 2024-12-01 RX ORDER — LIDOCAINE HYDROCHLORIDE 20 MG/ML
INJECTION, SOLUTION INFILTRATION; PERINEURAL PRN
Status: DISCONTINUED | OUTPATIENT
Start: 2024-12-01 | End: 2024-12-01

## 2024-12-01 RX ORDER — HYDROMORPHONE HCL IN WATER/PF 6 MG/30 ML
0.4 PATIENT CONTROLLED ANALGESIA SYRINGE INTRAVENOUS EVERY 5 MIN PRN
Status: DISCONTINUED | OUTPATIENT
Start: 2024-12-01 | End: 2024-12-01 | Stop reason: HOSPADM

## 2024-12-01 RX ORDER — PROCHLORPERAZINE MALEATE 10 MG
10 TABLET ORAL EVERY 6 HOURS PRN
Status: DISCONTINUED | OUTPATIENT
Start: 2024-12-01 | End: 2024-12-03 | Stop reason: HOSPADM

## 2024-12-01 RX ORDER — FENTANYL CITRATE 50 UG/ML
50 INJECTION, SOLUTION INTRAMUSCULAR; INTRAVENOUS EVERY 5 MIN PRN
Status: DISCONTINUED | OUTPATIENT
Start: 2024-12-01 | End: 2024-12-01 | Stop reason: HOSPADM

## 2024-12-01 RX ORDER — ONDANSETRON 4 MG/1
4 TABLET, ORALLY DISINTEGRATING ORAL EVERY 6 HOURS PRN
Status: DISCONTINUED | OUTPATIENT
Start: 2024-12-01 | End: 2024-12-03 | Stop reason: HOSPADM

## 2024-12-01 RX ORDER — ONDANSETRON 2 MG/ML
4 INJECTION INTRAMUSCULAR; INTRAVENOUS EVERY 30 MIN PRN
Status: DISCONTINUED | OUTPATIENT
Start: 2024-12-01 | End: 2024-12-01 | Stop reason: HOSPADM

## 2024-12-01 RX ORDER — OXYCODONE HYDROCHLORIDE 5 MG/1
10 TABLET ORAL EVERY 4 HOURS PRN
Status: DISCONTINUED | OUTPATIENT
Start: 2024-12-01 | End: 2024-12-03 | Stop reason: HOSPADM

## 2024-12-01 RX ORDER — CEFAZOLIN SODIUM/WATER 2 G/20 ML
SYRINGE (ML) INTRAVENOUS PRN
Status: DISCONTINUED | OUTPATIENT
Start: 2024-12-01 | End: 2024-12-01

## 2024-12-01 RX ORDER — HYDROMORPHONE HCL IN WATER/PF 6 MG/30 ML
0.4 PATIENT CONTROLLED ANALGESIA SYRINGE INTRAVENOUS
Status: DISCONTINUED | OUTPATIENT
Start: 2024-12-01 | End: 2024-12-03 | Stop reason: HOSPADM

## 2024-12-01 RX ORDER — FENTANYL CITRATE 50 UG/ML
25 INJECTION, SOLUTION INTRAMUSCULAR; INTRAVENOUS EVERY 5 MIN PRN
Status: DISCONTINUED | OUTPATIENT
Start: 2024-12-01 | End: 2024-12-01 | Stop reason: HOSPADM

## 2024-12-01 RX ORDER — GABAPENTIN 300 MG/1
300 CAPSULE ORAL 3 TIMES DAILY
Status: DISCONTINUED | OUTPATIENT
Start: 2024-12-01 | End: 2024-12-03 | Stop reason: HOSPADM

## 2024-12-01 RX ORDER — CALCIUM CARBONATE 500 MG/1
500 TABLET, CHEWABLE ORAL 4 TIMES DAILY PRN
Status: DISCONTINUED | OUTPATIENT
Start: 2024-12-01 | End: 2024-12-03 | Stop reason: HOSPADM

## 2024-12-01 RX ORDER — BUPIVACAINE HYDROCHLORIDE AND EPINEPHRINE 2.5; 5 MG/ML; UG/ML
INJECTION, SOLUTION EPIDURAL; INFILTRATION; INTRACAUDAL; PERINEURAL PRN
Status: DISCONTINUED | OUTPATIENT
Start: 2024-12-01 | End: 2024-12-01 | Stop reason: HOSPADM

## 2024-12-01 RX ORDER — HYDROMORPHONE HCL IN WATER/PF 6 MG/30 ML
0.2 PATIENT CONTROLLED ANALGESIA SYRINGE INTRAVENOUS
Status: DISCONTINUED | OUTPATIENT
Start: 2024-12-01 | End: 2024-12-03 | Stop reason: HOSPADM

## 2024-12-01 RX ORDER — DEXAMETHASONE SODIUM PHOSPHATE 4 MG/ML
INJECTION, SOLUTION INTRA-ARTICULAR; INTRALESIONAL; INTRAMUSCULAR; INTRAVENOUS; SOFT TISSUE PRN
Status: DISCONTINUED | OUTPATIENT
Start: 2024-12-01 | End: 2024-12-01

## 2024-12-01 RX ORDER — IBUPROFEN 400 MG/1
800 TABLET, FILM COATED ORAL EVERY 6 HOURS PRN
Status: DISCONTINUED | OUTPATIENT
Start: 2024-12-01 | End: 2024-12-03 | Stop reason: HOSPADM

## 2024-12-01 RX ORDER — SODIUM CHLORIDE, SODIUM LACTATE, POTASSIUM CHLORIDE, CALCIUM CHLORIDE 600; 310; 30; 20 MG/100ML; MG/100ML; MG/100ML; MG/100ML
INJECTION, SOLUTION INTRAVENOUS CONTINUOUS
Status: DISCONTINUED | OUTPATIENT
Start: 2024-12-01 | End: 2024-12-01 | Stop reason: HOSPADM

## 2024-12-01 RX ORDER — LIDOCAINE 40 MG/G
CREAM TOPICAL
Status: DISCONTINUED | OUTPATIENT
Start: 2024-12-01 | End: 2024-12-03

## 2024-12-01 RX ORDER — HYDROMORPHONE HCL IN WATER/PF 6 MG/30 ML
0.2 PATIENT CONTROLLED ANALGESIA SYRINGE INTRAVENOUS EVERY 5 MIN PRN
Status: DISCONTINUED | OUTPATIENT
Start: 2024-12-01 | End: 2024-12-01 | Stop reason: HOSPADM

## 2024-12-01 RX ORDER — ONDANSETRON 4 MG/1
4 TABLET, ORALLY DISINTEGRATING ORAL EVERY 30 MIN PRN
Status: DISCONTINUED | OUTPATIENT
Start: 2024-12-01 | End: 2024-12-01 | Stop reason: HOSPADM

## 2024-12-01 RX ORDER — SODIUM CHLORIDE, SODIUM LACTATE, POTASSIUM CHLORIDE, CALCIUM CHLORIDE 600; 310; 30; 20 MG/100ML; MG/100ML; MG/100ML; MG/100ML
INJECTION, SOLUTION INTRAVENOUS CONTINUOUS PRN
Status: DISCONTINUED | OUTPATIENT
Start: 2024-12-01 | End: 2024-12-01

## 2024-12-01 RX ORDER — CYCLOBENZAPRINE HCL 10 MG
10 TABLET ORAL EVERY 8 HOURS PRN
Status: DISCONTINUED | OUTPATIENT
Start: 2024-12-01 | End: 2024-12-03 | Stop reason: HOSPADM

## 2024-12-01 RX ORDER — NALOXONE HYDROCHLORIDE 0.4 MG/ML
0.1 INJECTION, SOLUTION INTRAMUSCULAR; INTRAVENOUS; SUBCUTANEOUS
Status: DISCONTINUED | OUTPATIENT
Start: 2024-12-01 | End: 2024-12-01 | Stop reason: HOSPADM

## 2024-12-01 RX ORDER — ONDANSETRON 2 MG/ML
INJECTION INTRAMUSCULAR; INTRAVENOUS PRN
Status: DISCONTINUED | OUTPATIENT
Start: 2024-12-01 | End: 2024-12-01

## 2024-12-01 RX ADMIN — HYDROMORPHONE HYDROCHLORIDE 0.5 MG: 1 INJECTION, SOLUTION INTRAMUSCULAR; INTRAVENOUS; SUBCUTANEOUS at 12:54

## 2024-12-01 RX ADMIN — PROPOFOL 200 MG: 10 INJECTION, EMULSION INTRAVENOUS at 12:03

## 2024-12-01 RX ADMIN — MIDAZOLAM 2 MG: 1 INJECTION INTRAMUSCULAR; INTRAVENOUS at 11:57

## 2024-12-01 RX ADMIN — GABAPENTIN 300 MG: 300 CAPSULE ORAL at 21:55

## 2024-12-01 RX ADMIN — GABAPENTIN 100 MG: 100 CAPSULE ORAL at 00:06

## 2024-12-01 RX ADMIN — GABAPENTIN 100 MG: 100 CAPSULE ORAL at 09:50

## 2024-12-01 RX ADMIN — Medication 2 G: at 12:03

## 2024-12-01 RX ADMIN — DEXMEDETOMIDINE HYDROCHLORIDE 20 MCG: 100 INJECTION, SOLUTION INTRAVENOUS at 12:21

## 2024-12-01 RX ADMIN — FENTANYL CITRATE 50 MCG: 50 INJECTION INTRAMUSCULAR; INTRAVENOUS at 12:03

## 2024-12-01 RX ADMIN — SODIUM CHLORIDE, POTASSIUM CHLORIDE, SODIUM LACTATE AND CALCIUM CHLORIDE: 600; 310; 30; 20 INJECTION, SOLUTION INTRAVENOUS at 12:03

## 2024-12-01 RX ADMIN — Medication 200 MG: at 12:54

## 2024-12-01 RX ADMIN — CYCLOBENZAPRINE 10 MG: 10 TABLET, FILM COATED ORAL at 08:30

## 2024-12-01 RX ADMIN — DEXAMETHASONE SODIUM PHOSPHATE 4 MG: 4 INJECTION, SOLUTION INTRA-ARTICULAR; INTRALESIONAL; INTRAMUSCULAR; INTRAVENOUS; SOFT TISSUE at 12:03

## 2024-12-01 RX ADMIN — ROCURONIUM BROMIDE 50 MG: 50 INJECTION, SOLUTION INTRAVENOUS at 12:03

## 2024-12-01 RX ADMIN — PHENYLEPHRINE HYDROCHLORIDE 100 MCG: 10 INJECTION INTRAVENOUS at 12:57

## 2024-12-01 RX ADMIN — ONDANSETRON 4 MG: 2 INJECTION INTRAMUSCULAR; INTRAVENOUS at 12:54

## 2024-12-01 RX ADMIN — SODIUM CHLORIDE, POTASSIUM CHLORIDE, SODIUM LACTATE AND CALCIUM CHLORIDE: 600; 310; 30; 20 INJECTION, SOLUTION INTRAVENOUS at 14:10

## 2024-12-01 RX ADMIN — OXYCODONE HYDROCHLORIDE 5 MG: 5 TABLET ORAL at 02:55

## 2024-12-01 RX ADMIN — LIDOCAINE HYDROCHLORIDE 100 MG: 20 INJECTION, SOLUTION INFILTRATION; PERINEURAL at 12:03

## 2024-12-01 RX ADMIN — METHOCARBAMOL 500 MG: 500 TABLET ORAL at 21:55

## 2024-12-01 RX ADMIN — FENTANYL CITRATE 50 MCG: 50 INJECTION INTRAMUSCULAR; INTRAVENOUS at 11:57

## 2024-12-01 RX ADMIN — HYDROMORPHONE HYDROCHLORIDE 0.4 MG: 0.2 INJECTION, SOLUTION INTRAMUSCULAR; INTRAVENOUS; SUBCUTANEOUS at 04:17

## 2024-12-01 RX ADMIN — ROCURONIUM BROMIDE 10 MG: 50 INJECTION, SOLUTION INTRAVENOUS at 12:44

## 2024-12-01 ASSESSMENT — ACTIVITIES OF DAILY LIVING (ADL)
ADLS_ACUITY_SCORE: 48
ADLS_ACUITY_SCORE: 44
ADLS_ACUITY_SCORE: 48
ADLS_ACUITY_SCORE: 48
ADLS_ACUITY_SCORE: 44
ADLS_ACUITY_SCORE: 48
ADLS_ACUITY_SCORE: 48
ADLS_ACUITY_SCORE: 44
ADLS_ACUITY_SCORE: 48
ADLS_ACUITY_SCORE: 44
ADLS_ACUITY_SCORE: 44
ADLS_ACUITY_SCORE: 48
ADLS_ACUITY_SCORE: 48
ADLS_ACUITY_SCORE: 44
ADLS_ACUITY_SCORE: 48

## 2024-12-01 ASSESSMENT — LIFESTYLE VARIABLES: TOBACCO_USE: 1

## 2024-12-01 NOTE — PROGRESS NOTES
[facility]    Hospitalist Progress Note    Interval History       -Data reviewed today: I reviewed all new labs and imaging results over the last 24 hours. I personally reviewed the MRI lumbar spine image(s) showing L2-L3 disc bulge with large central disc protrusion producing severe spinal canal stenosis with complete effacement of all CSF spaces.  Markedly distended urinary bladder suggestive of retention.  Concern for cauda equina syndrome.  Moderate foraminal stenosis at L3-L4 and L4-L5.  Severe lateral stenosis at L4-L5. .    Physical Exam   Temp: 98  F (36.7  C) Temp src: Temporal BP: 127/69 Pulse: 99   Resp: 11 SpO2: 96 % O2 Device: None (Room air) Oxygen Delivery: 6 LPM  Vitals:    11/30/24 2155   Weight: 68 kg (149 lb 14.6 oz)     Vital Signs with Ranges  Temp:  [97.7  F (36.5  C)-98  F (36.7  C)] 98  F (36.7  C)  Pulse:  [] 99  Resp:  [11-18] 11  BP: (122-141)/(64-95) 127/69  SpO2:  [95 %-100 %] 96 %  I/O last 3 completed shifts:  In: -   Out: 625 [Urine:625]    Physical Exam  Constitutional:       Appearance: Normal appearance.   Cardiovascular:      Rate and Rhythm: Normal rate and regular rhythm.      Pulses: Normal pulses.      Heart sounds: Normal heart sounds.   Pulmonary:      Effort: Pulmonary effort is normal. No respiratory distress.      Breath sounds: Normal breath sounds.   Abdominal:      General: Abdomen is flat. Bowel sounds are normal. There is no distension.      Tenderness: There is no abdominal tenderness. There is no guarding.   Skin:     General: Skin is warm and dry.   Neurological:      General: No focal deficit present.      Comments: Reduced sensation in bilateral lower extremities.  Right leg 1 x 5 strength, left leg 2 x 5 strength.  Reduced reflexes.  Mobley catheter in place.           Medications   Current Facility-Administered Medications   Medication Dose Route Frequency Provider Last Rate Last Admin    lactated ringers infusion   Intravenous  Continuous Ovidio Salas MD         Current Facility-Administered Medications   Medication Dose Route Frequency Provider Last Rate Last Admin    [Auto Hold] gabapentin (NEURONTIN) capsule 100 mg  100 mg Oral TID Robe Kraus MD   100 mg at 12/01/24 0950    [Auto Hold] polyethylene glycol (MIRALAX) Packet 17 g  17 g Oral Daily Robe Kraus MD        [Auto Hold] sodium chloride (PF) 0.9% PF flush 3 mL  3 mL Intracatheter Q8H Robe Kraus MD   3 mL at 12/01/24 0830       Data   Recent Labs   Lab 12/01/24  0834 11/30/24  1838   WBC 8.0 6.8   HGB 13.5 13.6   MCV 93 92   * 137*    137   POTASSIUM 4.0 4.0   CHLORIDE 99 102   CO2 25 24   BUN 13.7 9.1   CR 0.89 0.79   ANIONGAP 14 11   ETTA 9.4 9.4   GLC 94 102*   ALBUMIN 4.6  --    PROTTOTAL 7.1  --    BILITOTAL 0.5  --    ALKPHOS 42  --    ALT 15  --    AST 25  --        Recent Results (from the past 24 hours)   Lumbar spine MRI w/o contrast    Narrative    EXAM: MR LUMBAR SPINE W/O CONTRAST  LOCATION: [facility]  DATE: 11/30/2024    INDICATION: Diffuse right leg weakness, history of lumbar DDD and HNP  COMPARISON: January 28, 2022.  TECHNIQUE: Routine Lumbar Spine MRI without IV contrast.    FINDINGS:   Motion degraded exam. The 12th ribs are not well visualized, likely secondary to motion. The last well-formed disc space will be designated L5-S1 in keeping with prior nomenclature. Straightening of the normal lumbar lordosis. Retrolisthesis L2 on L3   measuring 3 mm grade vertebral body heights are maintained. No acute anterior compression deformity or pathologic marrow signal abnormality. Normal distal spinal cord and cauda equina with conus medullaris at T12-L1 level. Markedly distended urinary   bladder. Unremarkable visualized bony pelvis.    T12-L1: Normal disc height and signal. No herniation. Normal facets. No spinal canal or neural foraminal stenosis.     L1-L2: Disc desiccation with minimal disc height  loss. Minimal disc bulge. No spinal canal stenosis. No severe foraminal stenosis.    L2-L3: Disc desiccation with moderate to advanced disc height loss. Disc bulge with superimposed large central disc protrusion produces severe spinal canal stenosis with complete effacement of all CSF spaces. There is no significant neural foraminal   stenosis.     L3-L4: Disc desiccation with moderate disc height loss. Disc bulge and mild facet degenerative change are present. Previously noted disc extrusion is resolved. There is no significant spinal canal stenosis. Probable moderate foraminal stenosis.    L4-L5: Disc desiccation with moderate disc height loss. Disc bulge with superimposed left central disc protrusion. Moderate facet degeneration. There is no spinal canal stenosis. Effacement of the left lateral recess. Suspect impingement of the left L5   nerve root. Moderate bilateral foraminal stenosis.    L5-S1: Disc desiccation with moderate disc height loss. Shallow disc bulge. Mild facet degeneration. No high-grade spinal canal or foraminal stenosis.      Impression    IMPRESSION:  1.  Significantly motion degraded exam with nonvisualized 12th ribs. In keeping with previous nomenclature the last well-formed disc space will be considered L5-S1.  2.  L2-3 disc bulge and large central disc protrusion produce severe spinal canal stenosis with complete effacement of all CSF spaces.   3.  Severe spinal canal stenosis at L2-3 and markedly distended urinary bladder concerning for urinary retention. Clinical correlation for potential urinary retention and cauda equina syndrome is recommended.  4.  Moderate foraminal stenosis L3-4 and L4-5.  5.  Severe lateral recess stenosis left at L4-5. Suspect impingement of the left L5 nerve root.      These findings were discussed with DR. CHAYITO BAEZ at 5:55 PM CST on November 30, 2024         Assessment & Plan      Randall Bateman is a 49 year old male with no significant PMH presenting  from [facility] for NSGY consultation given worsening low back pain, LE weakness, and abnormal MRI with severe lumbar stenosis.         # Low back pain  # Lower extremity weakness  # Severe spinal canal stenosis  # Urinary retention   - presenting with low back pain, weakness, now with urinary retention s/p velásquez catheter placement; no saddle anesthesia; normal rectal tone; has been evaluated by NSGY here, with plans for O.R in the morning.   -N.p.o. after midnight  -Multimodal pain control  -Maintain Velásquez catheter  -Appreciate excellent assistance from NSGY.  Plan on proceeding with L2-L3 decompressive microdiscectomy on 12/1/2024.        DVT Prophylaxis: Pneumatic Compression Devices  Code Status: Full Code  Medically Ready for Discharge: Anticipated in 2-4 Days        Fidelina Hyde MD, MD  [phone](p)

## 2024-12-01 NOTE — PHARMACY-ADMISSION MEDICATION HISTORY
Pharmacy Intern Admission Medication History    Admission medication history is complete. The information provided in this note is only as accurate as the sources available at the time of the update.    Information Source(s): Patient and CareEverywhere/SureScripts via phone    Pertinent Information:   - patient reports currently taking PTA gabapentin dose and schedule as 300 mg TID  - patient notes that prescription entries on PTA med list are from 11/27/24 [place] ED visit   - at time of interview, patient denies taking any other medications (RX, OTC, or herbal) not seen below    Changes made to PTA medication list:  Added: icy hot topical  Deleted: none  Changed: none    Allergies reviewed with patient and updates made in EHR: yes    Medication History Completed By: Cheryl Maria 12/1/2024 10:45 AM    PTA Med List   Medication Sig Last Dose/Taking    cyclobenzaprine (FLEXERIL) 10 MG tablet Take 1 tablet (10 mg) by mouth 3 times daily as needed for muscle spasms. 11/30/2024 Morning    diclofenac (VOLTAREN) 1 % topical gel Apply 4 g topically 4 times daily. (Patient taking differently: Apply 4 g topically 4 times daily as needed for moderate pain. (applied to lower back)) 11/30/2024 Morning    gabapentin (NEURONTIN) 300 MG capsule Begin with 300 mg p.o. once daily, then increase to 300 mg p.o. twice daily, and then to 300 mg 3 times daily if needed for back pain. (Patient taking differently: Take 300 mg by mouth 3 times daily.) 11/30/2024 Morning    Menthol, Topical Analgesic, (ICY HOT EX) Apply topically daily as needed (for lower back pain). Taking As Needed    oxyCODONE (ROXICODONE) 5 MG tablet Take 1 tablet (5 mg) by mouth every 6 hours as needed for severe pain. 11/30/2024 Morning

## 2024-12-01 NOTE — ANESTHESIA CARE TRANSFER NOTE
Patient: Randall Bateman    Procedure: Procedure(s):  Right Lumbar 2 to Lumbar 3 minimally invasive microdiscectomy       Diagnosis: Herniation of lumbar intervertebral disc with radiculopathy [M51.16]  Diagnosis Additional Information: No value filed.    Anesthesia Type:   General     Note:    Oropharynx: oropharynx clear of all foreign objects and spontaneously breathing  Level of Consciousness: awake and drowsy  Oxygen Supplementation: face mask  Level of Supplemental Oxygen (L/min / FiO2): 6  Independent Airway: airway patency satisfactory and stable  Dentition: dentition unchanged  Vital Signs Stable: post-procedure vital signs reviewed and stable  Report to RN Given: handoff report given  Patient transferred to: PACU    Handoff Report: Identifed the Patient, Identified the Reponsible Provider, Reviewed the pertinent medical history, Discussed the surgical course, Reviewed Intra-OP anesthesia mangement and issues during anesthesia, Set expectations for post-procedure period and Allowed opportunity for questions and acknowledgement of understanding      Vitals:  Vitals Value Taken Time   /64 12/01/24 1316   Temp     Pulse 104 12/01/24 1317   Resp 15 12/01/24 1317   SpO2 100 % 12/01/24 1317   Vitals shown include unfiled device data.    Electronically Signed By: MIRA Suarez CRNA  December 1, 2024  1:18 PM

## 2024-12-01 NOTE — ANESTHESIA POSTPROCEDURE EVALUATION
Patient: Randall Bateman    Procedure: Procedure(s):  Right Lumbar 2 to Lumbar 3 minimally invasive microdiscectomy       Anesthesia Type:  General    Note:  Disposition: Inpatient   Postop Pain Control: Uneventful            Sign Out: Well controlled pain   PONV: No   Neuro/Psych: Uneventful            Sign Out: Acceptable/Baseline neuro status   Airway/Respiratory: Uneventful            Sign Out: Acceptable/Baseline resp. status   CV/Hemodynamics: Uneventful            Sign Out: Acceptable CV status   Other NRE: NONE   DID A NON-ROUTINE EVENT OCCUR? No           Last vitals:  Vitals Value Taken Time   /81 12/01/24 1415   Temp 36.7  C (98  F) 12/01/24 1315   Pulse 92 12/01/24 1418   Resp 9 12/01/24 1418   SpO2 96 % 12/01/24 1418   Vitals shown include unfiled device data.    Electronically Signed By: Ovidio Salas MD  December 1, 2024  3:49 PM

## 2024-12-01 NOTE — CONSULTS
NEUROSURGERY CONSULT  [facility]   [facility]         PLAN:    Mr. Bateman exhibits, at L2-3, a disc bulge and large central disc protrusion which produces severe spinal canal stenosis with complete effacement of all CSF spaces on his most recent MRI that correlates with the objective findings on his physical exam. There are no signs of causa equina syndrome. However, we feel that he would be best amendable to surgical intervention. We discussed surgical options that included a L2-3 decompressive microdiscectomy. We also discussed continuing with non-surgical options, although the risk of permanent nerve damage is greater with continued delay of relieving the pressure from the nerve due to the stenosis.     We reviewed the surgical risks including nerve damage, infection, bleeding, residual or recurrent disk / symptoms and spinal fluid leak. This will also be performed utilizing general anesthesia which can pose both cardiovascular and respiratory risks as well.     NPO at midnight. Procedure to be performed tomorrow, 12/01/2024, by Dr. Adams.    We did review the images together and explained his condition and the recommended treatment. We also discussed signs of a worsening problem that he should seek being evaluated.     He appeared to have a good understanding of the situation, asked appropriate questions which we answered, and wished to proceed as we had previously recommended.     Please page our on-call service for any questions or concerns.     It has been a pleasure meeting Randall Bateman. Thank you for having us be involved in his care.    ______________________________________________________________________    HPI:    Randall Bateman is a pleasant 49 year old male who presented to the [facility] for consultation on lumbar spine pain with right worse than left leg radiculopathy. He describes the symptoms as a constant, sharp,  burning pain that initiates in the left low lumbar region and radiates distally to his toes. This pain is accompanied with paresthesia and weakness. The symptoms have been present for one week approximately although this has been an issue for him for many years. He used to obtain cares are [facility]. Neither a traumatic or provocative mechanism can be appreciated. He has been urinating normally and was not aware that he has been retaining urine. No change in his BM's. No other concerns are voiced.    No past medical history on file.    No past surgical history on file.    No Known Allergies    Social History     Tobacco Use    Smoking status: Every Day    Smokeless tobacco: Never   Substance Use Topics    Alcohol use: Not on file       No family history on file.    Scheduled Medications    No current facility-administered medications for this encounter.       Home Medications    cyclobenzaprine (FLEXERIL) 10 MG tablet     diclofenac (VOLTAREN) 1 % topical gel  gabapentin (NEURONTIN) 300 MG capsule  oxyCODONE (ROXICODONE) 5 MG tablet     PRN Medications    No current facility-administered medications for this encounter.       ROS: 10 point ROS neg other than the symptoms noted above in the HPI.    Vitals:    BP (!) 134/95 (BP Location: Right arm)   Pulse 99   Temp 98  F (36.7  C) (Oral)   Wt 68 kg (149 lb 14.6 oz)   SpO2 96%   BMI 20.91 kg/m    Body mass index is 20.91 kg/m .  No intake/output data recorded.    Exam:    Patient appears comfortable, conversational, and in no apparent distress.   Head: Normocephalic, without obvious abnormality, atraumatic, no facial asymmetry.   Eyes: conjunctivae/corneas clear. PERRL, EOM's intact.   Throat: lips, mucosa, and tongue normal; teeth and gums normal.   Neck: supple, symmetrical, trachea midline, no adenopathy and thyroid: not enlarged, symmetric, no tenderness/mass/nodules.   Lungs: clear to auscultation bilaterally.   Heart: regular rate and rhythm.    Abdomen: soft, non-tender; bowel sounds normal; no masses, no organomegaly.   Pulses: 2+ and symmetric.   Skin: Skin color, texture, turgor normal. No rashes or lesions.     CN II-XII grossly intact, alert and appropriate with conversation and following commands.   Cervical spine is non tender to palpation. Appropriate range of motion of neck, not concerning for lhermitte's phenomenon.   Bilateral bicep 2/4 and tricep reflexes 1/4. Sensation intact throughout upper extremities.     UE muscle strength  Right  Left    Deltoid  5/5  5/5    Biceps  5/5  5/5    Triceps  5/5  5/5    Hand intrinsics  5/5  5/5    Hand grasp  5/5  5/5    Neville signs  neg  neg      Lumbar spine is tender to palpation.   Diminished sensation throughout lower extremities, right worse than left.   Right patellar 0/4 and achilles reflex 0/4. Positive for pain with straight leg raise - right.     LE muscle strength  Right  Left    Iliopsoas (hip flexion)  3+/5  5/5    Quad (knee extension)  3+/5  5/5    Hamstring (knee flexion)  4/5  5/5    Gastrocnemius (PF)  5/5  5/5    Tibialis Ant. (DF)  4/5  4/5    EHL  5/5  5/5      Negative Babinski bilaterally. Negative for clonus.   Calves are soft and non-tender bilaterally.     Imaging:   Impression per radiology read - I have personally reviewed the images with the patient.    MR LUMBAR SPINE W/O CONTRAST  LOCATION: [facility]  DATE: 11/30/2024     INDICATION: Diffuse right leg weakness, history of lumbar DDD and HNP  COMPARISON: January 28, 2022.  TECHNIQUE: Routine Lumbar Spine MRI without IV contrast.     FINDINGS:   Motion degraded exam. The 12th ribs are not well visualized, likely secondary to motion. The last well-formed disc space will be designated L5-S1 in keeping with prior nomenclature. Straightening of the normal lumbar lordosis. Retrolisthesis L2 on L3   measuring 3 mm grade vertebral body heights are maintained. No acute anterior compression deformity or  pathologic marrow signal abnormality. Normal distal spinal cord and cauda equina with conus medullaris at T12-L1 level. Markedly distended urinary   bladder. Unremarkable visualized bony pelvis.     T12-L1: Normal disc height and signal. No herniation. Normal facets. No spinal canal or neural foraminal stenosis.      L1-L2: Disc desiccation with minimal disc height loss. Minimal disc bulge. No spinal canal stenosis. No severe foraminal stenosis.     L2-L3: Disc desiccation with moderate to advanced disc height loss. Disc bulge with superimposed large central disc protrusion produces severe spinal canal stenosis with complete effacement of all CSF spaces. There is no significant neural foraminal   stenosis.      L3-L4: Disc desiccation with moderate disc height loss. Disc bulge and mild facet degenerative change are present. Previously noted disc extrusion is resolved. There is no significant spinal canal stenosis. Probable moderate foraminal stenosis.     L4-L5: Disc desiccation with moderate disc height loss. Disc bulge with superimposed left central disc protrusion. Moderate facet degeneration. There is no spinal canal stenosis. Effacement of the left lateral recess. Suspect impingement of the left L5   nerve root. Moderate bilateral foraminal stenosis.     L5-S1: Disc desiccation with moderate disc height loss. Shallow disc bulge. Mild facet degeneration. No high-grade spinal canal or foraminal stenosis.                                                                   IMPRESSION:  1.  Significantly motion degraded exam with nonvisualized 12th ribs. In keeping with previous nomenclature the last well-formed disc space will be considered L5-S1.  2.  L2-3 disc bulge and large central disc protrusion produce severe spinal canal stenosis with complete effacement of all CSF spaces.   3.  Severe spinal canal stenosis at L2-3 and markedly distended urinary bladder concerning for urinary retention. Clinical correlation  "for potential urinary retention and cauda equina syndrome is recommended.  4.  Moderate foraminal stenosis L3-4 and L4-5.  5.  Severe lateral recess stenosis left at L4-5. Suspect impingement of the left L5 nerve root.    Available labs at time of consult:       Recent Labs   Lab 11/30/24  1838   WBC 6.8   HGB 13.6   HCT 40.1   MCV 92   *     Recent Labs   Lab 11/30/24  1838   WBC 6.8   HGB 13.6   HCT 40.1   MCV 92   *     No results for input(s): \"SED\", \"CRP\" in the last 168 hours.  Recent Labs   Lab 11/30/24  1838   HGB 13.6     No results for input(s): \"INR\" in the last 168 hours.  Recent Labs   Lab 11/30/24  1838   *     Recent Labs   Lab 11/30/24  1838   WBC 6.8         Respectfully,    LIZ Loyola, PA-C  [facility]surgery  [facility]     Tel: [phone]      All imaging, physical findings, and the above plan have been reviewed with Dr. Adams.    "

## 2024-12-01 NOTE — OP NOTE
December 1, 2024    [facility]   Operative Note    Pre-operative diagnosis: Herniation of lumbar intervertebral disc with radiculopathy [M51.16]   Post-operative diagnosis Same   Procedure: Procedure(s):  Right Lumbar 2 to Lumbar 3 minimally invasive microdiscectomy    Surgeon(s): Surgeons and Role:     * Satnam Adams MD - Primary   Estimated blood loss:  5ml   Specimens: * No specimens in log *   Findings: Large central L2-3 herniated disc resected with large disc fragment removed.       Indications: Patient is a 49-year-old male with back and lower extremity symptoms and severe canal stenosis at L2-3 from a large central disc herniation.  He was brought for the above-mentioned procedure.    Description of procedure: Patient was brought to the operating room.  General endotracheal anesthesia was induced.  Patient was rolled into the prone position on the Gorge frame and the back was prepped and draped in a sterile fashion.  C-arm fluoroscopy was used to localize the appropriate level.  A right-sided approach was performed using the Metrix tubular dilating system and the level was again confirmed with fluoroscopy.  The microscope was sterilely draped and brought in the field.  The high-speed drill was used to perform a laminotomy and medial facetectomy and then the ligamentum flavum was elevated and resected along with the medial facet.  The thecal sac was identified and the herniated disc located in the anterior epidural space.  After careful dissection, a large herniated disc fragment was removed, decompressing the thecal sac.  Several small fragments were then further removed.  The epidural space was explored and no further fragments were identified.  Hemostasis was achieved.  The fascia was closed with 0 Vicryl.  2-0 Vicryl was used for subcutaneous layer.  4-0 Monocryl was used for the skin with a superglue dressing.

## 2024-12-01 NOTE — ANESTHESIA PREPROCEDURE EVALUATION
Anesthesia Pre-Procedure Evaluation    Patient: Randall Bateman   MRN: [id number] : 1975        Procedure : Procedure(s):  Right Lumbar 2 to Lumbar 3 minimally invasive microdiscectomy          No past medical history on file.   No past surgical history on file.   No Known Allergies   Social History     Tobacco Use    Smoking status: Every Day    Smokeless tobacco: Never   Substance Use Topics    Alcohol use: Not on file      Wt Readings from Last 1 Encounters:   24 68 kg (149 lb 14.6 oz)        Anesthesia Evaluation            ROS/MED HX  ENT/Pulmonary:     (+)                tobacco use,                     (-) sleep apnea   Neurologic:     (+)    peripheral neuropathy, - from spinal stenosis.                           Cardiovascular:       METS/Exercise Tolerance:     Hematologic:       Musculoskeletal:       GI/Hepatic:    (-) GERD   Renal/Genitourinary:       Endo:       Psychiatric/Substance Use:       Infectious Disease:       Malignancy:       Other:            Physical Exam    Airway        Mallampati: I   TM distance: > 3 FB   Neck ROM: full   Mouth opening: > 3 cm    Respiratory Devices and Support         Dental       (+) Minor Abnormalities - some fillings, tiny chips      Cardiovascular   cardiovascular exam normal          Pulmonary   pulmonary exam normal                OUTSIDE LABS:  CBC:   Lab Results   Component Value Date    WBC 8.0 2024    WBC 6.8 2024    HGB 13.5 2024    HGB 13.6 2024    HCT 41.2 2024    HCT 40.1 2024     (L) 2024     (L) 2024     BMP:   Lab Results   Component Value Date     2024     2024    POTASSIUM 4.0 2024    POTASSIUM 4.0 2024    CHLORIDE 99 2024    CHLORIDE 102 2024    CO2 25 2024    CO2 24 2024    BUN 13.7 2024    BUN 9.1 2024    CR 0.89 2024    CR 0.79 2024    GLC 94 2024     (H) 2024  "    COAGS: No results found for: \"PTT\", \"INR\", \"FIBR\"  POC: No results found for: \"BGM\", \"HCG\", \"HCGS\"  HEPATIC:   Lab Results   Component Value Date    ALBUMIN 4.6 12/01/2024    PROTTOTAL 7.1 12/01/2024    ALT 15 12/01/2024    AST 25 12/01/2024    ALKPHOS 42 12/01/2024    BILITOTAL 0.5 12/01/2024     OTHER:   Lab Results   Component Value Date    ETTA 9.4 12/01/2024       Anesthesia Plan    ASA Status:  2    NPO Status:  NPO Appropriate    Anesthesia Type: General.     - Airway: ETT   Induction: Intravenous, Propofol.   Maintenance: Inhalation.        Consents    Anesthesia Plan(s) and associated risks, benefits, and realistic alternatives discussed. Questions answered and patient/representative(s) expressed understanding.     - Discussed:     - Discussed with:  Patient            Postoperative Care    Pain management: IV analgesics, Oral pain medications.   PONV prophylaxis: Ondansetron (or other 5HT-3), Dexamethasone or Solumedrol     Comments:               Ovidoi Salas MD    I have reviewed the pertinent notes and labs in the chart from the past 30 days and (re)examined the patient.  Any updates or changes from those notes are reflected in this note.                                   "

## 2024-12-01 NOTE — PROGRESS NOTES
Neurosurgery Progress     Dx:     L2-3 disc bulge and large central disc protrusion produce severe spinal canal stenosis with complete effacement of all CSF spaces.     Neuro stable.     TODAY'S PLAN:     L2-3 decompressive microdiscectomy to be performed today by Dr. Adams.      In the event that patient's symptoms worsen or change we would appreciate being contacted. We did discuss signs of a worsening problem that he should seek being evaluated.     We did review the above information with the patient whom agrees with the plan and did verbalize understanding.   ________________________________________________________________     Mr. Bateman continues to have discomfort. Anxious for surgery. Did not get much sleep last night.     /75 (BP Location: Right arm)   Pulse 80   Temp 97.7  F (36.5  C) (Oral)   Resp 18   Wt 68 kg (149 lb 14.6 oz)   SpO2 95%   BMI 20.91 kg/m       Pt in bed. Appears uncomfortable but in no apparent distress.  CN II-XII intact, alert and appropriate with conversation and following commands.   Bilateral upper and lower extremities with diminished strength, right worse than left. DTR's diminished. Sensation diminished throughout BLE.   Calves soft and non-tender bilaterally.     All pertinent labs and updated imaging reviewed in EPIC.     Carson Reese PA-C   Neurosurgery

## 2024-12-01 NOTE — ANESTHESIA PROCEDURE NOTES
Airway       Patient location during procedure: OR       Procedure Start/Stop Times: 12/1/2024 12:04 PM  Staff -        Anesthesiologist:  Ovidio Salas MD       CRNA: Krystal Sorenson APRN CRNA       Performed By: CRNA  Consent for Airway        Urgency: elective  Indications and Patient Condition       Indications for airway management: fozia-procedural       Induction type:intravenous       Mask difficulty assessment: 2 - vent by mask + OA or adjuvant +/- NMBA    Final Airway Details       Final airway type: endotracheal airway       Successful airway: ETT - single  Endotracheal Airway Details        Cuffed: yes       Successful intubation technique: video laryngoscopy       VL Blade Size: Billy 4       Grade View of Cords: 1       Adjucts: stylet       Position: Right       Measured from: lips       Secured at (cm): 24       Bite block used: None    Post intubation assessment        Placement verified by: capnometry, equal breath sounds and chest rise        Number of attempts at approach: 1       Number of other approaches attempted: 0       Secured with: tape       Ease of procedure: easy       Dentition: Intact and Unchanged    Medication(s) Administered   Medication Administration Time: 12/1/2024 12:04 PM

## 2024-12-01 NOTE — H&P
[facility]    History and Physical - Hospitalist Service       Date of Admission:  11/30/2024    Assessment & Plan      Randall Bateman is a 49 year old male with no significant PMH presenting from [facility] for NSGY consultation given worsening low back pain, LE weakness, and abnormal MRI with severe lumbar stenosis.       # Low back pain  # Lower extremity weakness  # Severe spinal canal stenosis  # Urinary retention   - presenting with low back pain, weakness, now with urinary retention s/p velásquez catheter placement; no saddle anesthesia; normal rectal tone; has been evaluated by NSGY here, with plans for O.R in the morning.   -N.p.o. after midnight  -Multimodal pain control  -Maintain Velásquez catheter  -Appreciate excellent assistance from NSGY             Diet: NPO per Anesthesia Guidelines for Procedure/Surgery Except for: Meds    DVT Prophylaxis: Pneumatic Compression Devices  Velásquez Catheter: PRESENT, indication: ?  (Error. Value could not be saved.)  Lines: None     Cardiac Monitoring: None  Code Status: Full Code      Clinically Significant Risk Factors Present on Admission                                        Disposition Plan     Medically Ready for Discharge: Anticipated in 2-4 Days           STEFANI OLIVARES MD  Hospitalist Service  [facility]  Securely message with [employer] (more info)  Text page via [employer] Paging/Directory     ______________________________________________________________________    Chief Complaint   Weakness, back pain     History is obtained from the patient    History of Present Illness   Randall Bateman is a 49 year old male who presents for outside hospital to [facility] for neurosurgery evaluation in the setting of back pain, right lower extremity weakness, radiculopathy with MRI lumbar spine imaging showing severe canal stenosis.   Patient states he has been having on and off back pain for the past 1.5  month.  However since Wednesday, he has been having difficulty getting out of bed due to significant low back pain coupled with lower extremity weakness; he also reports his legs to buckle hands, leading to multiple falls.  Patient reported waking up this morning could not walk, he called his sister to bring him to the hospital.  He had up going to him at [facility] where he underwent MRI.  He was also found to be retaining urine for which Mobley catheter was placed.  At this time, he denies any saddle anesthesia.  There is no other pain in any other joints.      Past Medical History    No past medical history on file.    Past Surgical History   No past surgical history on file.    Prior to Admission Medications   Prior to Admission Medications   Prescriptions Last Dose Informant Patient Reported? Taking?   cyclobenzaprine (FLEXERIL) 10 MG tablet   No No   Sig: Take 1 tablet (10 mg) by mouth 3 times daily as needed for muscle spasms.   diclofenac (VOLTAREN) 1 % topical gel   No No   Sig: Apply 4 g topically 4 times daily.   gabapentin (NEURONTIN) 300 MG capsule   No No   Sig: Begin with 300 mg p.o. once daily, then increase to 300 mg p.o. twice daily, and then to 300 mg 3 times daily if needed for back pain.   oxyCODONE (ROXICODONE) 5 MG tablet   No No   Sig: Take 1 tablet (5 mg) by mouth every 6 hours as needed for severe pain.      Facility-Administered Medications: None           Physical Exam   Vital Signs: Temp: 98  F (36.7  C) Temp src: Oral BP: (!) 134/95 Pulse: 99     SpO2: 96 %      Weight: 149 lbs 14.6 oz    General Appearance: Lying in bed, on room air, in mild distress.  HEENT: PERRL: EOMI; moist mucous membrane w/o lesions  Neck: No JVD  Pulmonary: Clear to auscultation bilaterally, no wheezes or crackles  CVS: Regular rhythm, no murmurs, rubs or gallops  GI: BS (+), soft nontender, no rebound or guarding   Extremities: No LE edema; pulses strong and equal throughout   Skin: No rashes or  lesions  Neurologic: A&O x3; sitting in bed, reclined, in mild distress.  Moves all extremities spontaneously.  Noted to have right lower extremity weakness compared to left.  Sensation wise, normal bilateral lower extremity to touch, though reports absence of pressure in bilateral lower extremity.      Medical Decision Making       65 MINUTES SPENT BY ME on the date of service doing chart review, history, exam, documentation & further activities per the note.      Data   ------------------------- PAST 24 HR DATA REVIEWED -----------------------------------------------    I have personally reviewed the following data over the past 24 hrs:    6.8  \   13.6   / 137 (L)     137 102 9.1 /  102 (H)   4.0 24 0.79 \       Imaging results reviewed over the past 24 hrs:   Recent Results (from the past 24 hours)   Lumbar spine MRI w/o contrast    Narrative    EXAM: MR LUMBAR SPINE W/O CONTRAST  LOCATION: [facility]  DATE: 11/30/2024    INDICATION: Diffuse right leg weakness, history of lumbar DDD and HNP  COMPARISON: January 28, 2022.  TECHNIQUE: Routine Lumbar Spine MRI without IV contrast.    FINDINGS:   Motion degraded exam. The 12th ribs are not well visualized, likely secondary to motion. The last well-formed disc space will be designated L5-S1 in keeping with prior nomenclature. Straightening of the normal lumbar lordosis. Retrolisthesis L2 on L3   measuring 3 mm grade vertebral body heights are maintained. No acute anterior compression deformity or pathologic marrow signal abnormality. Normal distal spinal cord and cauda equina with conus medullaris at T12-L1 level. Markedly distended urinary   bladder. Unremarkable visualized bony pelvis.    T12-L1: Normal disc height and signal. No herniation. Normal facets. No spinal canal or neural foraminal stenosis.     L1-L2: Disc desiccation with minimal disc height loss. Minimal disc bulge. No spinal canal stenosis. No severe foraminal  stenosis.    L2-L3: Disc desiccation with moderate to advanced disc height loss. Disc bulge with superimposed large central disc protrusion produces severe spinal canal stenosis with complete effacement of all CSF spaces. There is no significant neural foraminal   stenosis.     L3-L4: Disc desiccation with moderate disc height loss. Disc bulge and mild facet degenerative change are present. Previously noted disc extrusion is resolved. There is no significant spinal canal stenosis. Probable moderate foraminal stenosis.    L4-L5: Disc desiccation with moderate disc height loss. Disc bulge with superimposed left central disc protrusion. Moderate facet degeneration. There is no spinal canal stenosis. Effacement of the left lateral recess. Suspect impingement of the left L5   nerve root. Moderate bilateral foraminal stenosis.    L5-S1: Disc desiccation with moderate disc height loss. Shallow disc bulge. Mild facet degeneration. No high-grade spinal canal or foraminal stenosis.      Impression    IMPRESSION:  1.  Significantly motion degraded exam with nonvisualized 12th ribs. In keeping with previous nomenclature the last well-formed disc space will be considered L5-S1.  2.  L2-3 disc bulge and large central disc protrusion produce severe spinal canal stenosis with complete effacement of all CSF spaces.   3.  Severe spinal canal stenosis at L2-3 and markedly distended urinary bladder concerning for urinary retention. Clinical correlation for potential urinary retention and cauda equina syndrome is recommended.  4.  Moderate foraminal stenosis L3-4 and L4-5.  5.  Severe lateral recess stenosis left at L4-5. Suspect impingement of the left L5 nerve root.      These findings were discussed with DR. CHAYITO BAEZ at 5:55 PM CST on November 30, 2024

## 2024-12-01 NOTE — PLAN OF CARE
Goal Outcome Evaluation:    Vital signs stable on RA. Alert and oriented x 4. Lung sounds clear. Bowel sounds active, flatus present. Pain on the lower back. Pain controlled with dilaudid and oxy. Up with assist of 1-2. On NPO at midnight. CMS intact ex for tingling on the BLE.  Possible surgery today

## 2024-12-02 LAB — GLUCOSE BLDC GLUCOMTR-MCNC: 145 MG/DL (ref 70–99)

## 2024-12-02 PROCEDURE — 97530 THERAPEUTIC ACTIVITIES: CPT | Mod: GP | Performed by: PHYSICAL THERAPIST

## 2024-12-02 PROCEDURE — 97116 GAIT TRAINING THERAPY: CPT | Mod: GP | Performed by: PHYSICAL THERAPIST

## 2024-12-02 PROCEDURE — 250N000013 HC RX MED GY IP 250 OP 250 PS 637: Performed by: INTERNAL MEDICINE

## 2024-12-02 PROCEDURE — 250N000013 HC RX MED GY IP 250 OP 250 PS 637: Performed by: PHYSICIAN ASSISTANT

## 2024-12-02 PROCEDURE — 99207 PR NO BILLABLE SERVICE THIS VISIT: CPT | Performed by: HOSPITALIST

## 2024-12-02 PROCEDURE — 97162 PT EVAL MOD COMPLEX 30 MIN: CPT | Mod: GP | Performed by: PHYSICAL THERAPIST

## 2024-12-02 PROCEDURE — 99232 SBSQ HOSP IP/OBS MODERATE 35: CPT | Performed by: HOSPITALIST

## 2024-12-02 PROCEDURE — 250N000013 HC RX MED GY IP 250 OP 250 PS 637: Performed by: NURSE PRACTITIONER

## 2024-12-02 PROCEDURE — 120N000001 HC R&B MED SURG/OB

## 2024-12-02 RX ORDER — TAMSULOSIN HYDROCHLORIDE 0.4 MG/1
0.4 CAPSULE ORAL DAILY
Status: DISCONTINUED | OUTPATIENT
Start: 2024-12-02 | End: 2024-12-03

## 2024-12-02 RX ADMIN — GABAPENTIN 300 MG: 300 CAPSULE ORAL at 15:57

## 2024-12-02 RX ADMIN — METHOCARBAMOL 500 MG: 500 TABLET ORAL at 08:16

## 2024-12-02 RX ADMIN — METHOCARBAMOL 500 MG: 500 TABLET ORAL at 17:56

## 2024-12-02 RX ADMIN — METHOCARBAMOL 500 MG: 500 TABLET ORAL at 22:25

## 2024-12-02 RX ADMIN — POLYETHYLENE GLYCOL 3350 17 G: 17 POWDER, FOR SOLUTION ORAL at 08:17

## 2024-12-02 RX ADMIN — METHOCARBAMOL 500 MG: 500 TABLET ORAL at 14:07

## 2024-12-02 RX ADMIN — TAMSULOSIN HYDROCHLORIDE 0.4 MG: 0.4 CAPSULE ORAL at 17:56

## 2024-12-02 RX ADMIN — GABAPENTIN 300 MG: 300 CAPSULE ORAL at 22:25

## 2024-12-02 RX ADMIN — GABAPENTIN 300 MG: 300 CAPSULE ORAL at 08:16

## 2024-12-02 ASSESSMENT — ACTIVITIES OF DAILY LIVING (ADL)
ADLS_ACUITY_SCORE: 48
ADLS_ACUITY_SCORE: 48
ADLS_ACUITY_SCORE: 56
ADLS_ACUITY_SCORE: 48
ADLS_ACUITY_SCORE: 48
ADLS_ACUITY_SCORE: 54
ADLS_ACUITY_SCORE: 48
ADLS_ACUITY_SCORE: 48
ADLS_ACUITY_SCORE: 54
ADLS_ACUITY_SCORE: 56
ADLS_ACUITY_SCORE: 56
ADLS_ACUITY_SCORE: 48
ADLS_ACUITY_SCORE: 54
ADLS_ACUITY_SCORE: 56
ADLS_ACUITY_SCORE: 48

## 2024-12-02 NOTE — PLAN OF CARE
Goal Outcome Evaluation:    Shift Summary [id number]    Admitting Diagnosis: Spinal stenosis of lumbar region with neurogenic claudication  Lumbar radiculopathy   Vitals Vital signs:  Temp: 98  F (36.7  C) Temp src: Oral BP: 124/84 Pulse: 84   Resp: 20 SpO2: 96 % O2 Device: None (Room air)   Pain :Denies  A&Ox:4  Voiding :Urethral Catheter  Mobility :Not OOB   Tele :N/A  CMS :Baseline tingling on BLE  Lung Sounds Clear on 0 LPM via RA  GI :BS +4  Dressing :N/A    Orders Placed This Encounter      Advance Diet as Tolerated: Regular Diet Adult       Plan: Pt. Alert but sleepy, wakes up to voice. VSS. Wants to rest.

## 2024-12-02 NOTE — PROGRESS NOTES
Neurosurgery Progress Note:     POD#1 s/p L2-L3 minimally invasive microdiscectomy with Dr. Adams.    24 hours events: No events overnight.  Patient states his preoperative numbness has improved     On exam: Patient is alert and oriented patient states that his bilateral lower extremity numbness and weakness has improved.  Patient states he is now able to lift his legs and ambulate with assistance of physical therapy in the halls which she was not able to do previously.  He states that his left thigh and lower leg numbness has improved/resolved.  He states that his right thigh and lower leg numbness has also improved when compared to preoperative.  He states that he still has bilateral foot numbness and tingling.  Incision: Clean dry and intact     PLAN:  -Pain control  -PT OT-currently making recommendations for TCU  -Patient states that his legs still buckling hopefully he can progress to home with outpatient therapy or acute rehab  -Up with assist  -Okay for removal of Mobley catheter and trial of void.  No reason to wait till tomorrow.  -Per IDSA guidelines I would straight cath as needed versus leaving indwelling Mobley catheter.  If retention does not improve urology can be consulted for likely outpatient follow-up.  -Starting patient on 0.4 mg of Flomax daily     Neville Pang [facility] Neurosurgery  [facility]  Vocera messaging strongly preferred  Please always look up on-call provider before messaging/paging            Dragon Disclosure   This was created at least in part with a voice recognition software. Mistakes/typos may be present.

## 2024-12-02 NOTE — PLAN OF CARE
Shift: [id number] 12/1/24    Diagnosis: Herniation of Lumbar disc with radiculopathy     POD#: 0 s/p L2-L3 microdiscectomy  Mental Status: A/OX 4  Activity/dangle: Up A-1 w/ GB and walker  Diet: Regular diet  Pain: PRN Flexeril is helping but pain is better after the surgery.  Mobley/Voiding: Mobley intact draining with tea colored urine.  Tele/Restraints/Iso: None  02/LDA: RA  D/C Date: Plan pending  Other Info:    VSS. Still sleepy but easy to arouse. Tolerated the diet, denies nausea. Per pt repost last BM was Tuesday and that his baseline. +BS  and passing gas.

## 2024-12-02 NOTE — PROGRESS NOTES
"   12/02/24 1039   Appointment Info   Signing Clinician's Name / Credentials (PT) Larissa Barnes, PT   Rehab Comments (PT) Spinal precautions; needs h.o.   Living Environment   People in Home alone   Current Living Arrangements house   Home Accessibility stairs to enter home   Number of Stairs, Main Entrance 1   Transportation Anticipated family or friend will provide   Living Environment Comments Lives in a one level \"cabin\". Can stay at sister and brother-in-laws which is patient's childhood home that he knows well. There are lots of stairs but  a space can be set up on the main floor. 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   Self-Care   Usual Activity Tolerance good   Current Activity Tolerance fair   Equipment Currently Used at Home none   Fall history within last six months yes   Number of times patient has fallen within last six months 7  (ass on Fri. Nov. 29th related to back symtoms and L/E buckling; none prior.)   Activity/Exercise/Self-Care Comment Works driving heavy machinery. Lives and manages alone in one level home.   General Information   Onset of Illness/Injury or Date of Surgery 11/30/24   Referring Physician Yuniel Reese PA-C   Patient/Family Therapy Goals Statement (PT) To go home or to sister's home.   Pertinent History of Current Problem (include personal factors and/or comorbidities that impact the POC) POD1S/P Rt L2-L3 minimally invasive micro dissection   Existing Precautions/Restrictions fall;spinal   General Observations Long sitting in bed; agreeable to participate.   Cognition   Affect/Mental Status (Cognition) WNL   Orientation Status (Cognition) oriented x 4   Follows Commands (Cognition) WNL   Pain Assessment   Patient Currently in Pain Yes, see Vital Sign flowsheet  (2-3; mostly post surgical but some nerve pain with movement.)   Integumentary/Edema "   Integumentary/Edema no deficits were identifed   Posture    Posture Forward head position   Range of Motion (ROM)   ROM Comment actively limited on R L/E ifor hip flex. n sitting. In supine able to perform R heel slides.   Strength (Manual Muscle Testing)   Strength Comments R hip flex 2/5, knee extension 2/5, dorsiflexion 3/5. L L/E appears 3+/5 throughout   Bed Mobility   Comment, (Bed Mobility) Supine to sit via R sidelying with CGA and use of bedrail.   Transfers   Comment, (Transfers) STS with Mod on 1st performance   Gait/Stairs (Locomotion)   Comment, (Gait/Stairs) Amb. 10' for eval with WW and Min with R knee guarding for buckling; did have buckling later insession requiring Mod A. Second person for close wc follow.   Balance   Balance Comments Imbalance noted d/t incoordination of R L/E and narrow [place].   Coordination   Coordination other (see comments)   Coordination Comments deficits noted in R L/E with incoordination in step phase   Clinical Impression   Criteria for Skilled Therapeutic Intervention Yes, treatment indicated   PT Diagnosis (PT) Impaired functional mobility   Influenced by the following impairments weakness, incoordination of R L/E, balance deficits, pain   Functional limitations due to impairments decreased I in functional mobility, increased fall risk, increased need for A and AD   Clinical Presentation (PT Evaluation Complexity) evolving   Clinical Presentation Rationale clinical judgement   Clinical Decision Making (Complexity) moderate complexity   Planned Therapy Interventions (PT) balance training;bed mobility training;gait training;home exercise program;motor coordination training;neuromuscular re-education;patient/family education;stair training;strengthening;transfer training;progressive activity/exercise;risk factor education;home program guidelines   Risk & Benefits of therapy have been explained evaluation/treatment results reviewed;care plan/treatment goals  reviewed;risks/benefits reviewed;current/potential barriers reviewed;participants voiced agreement with care plan;participants included;patient   PT Total Evaluation Time   PT Eval, Moderate Complexity Minutes (61353) 10   Physical Therapy Goals   PT Frequency Daily   PT Predicted Duration/Target Date for Goal Attainment 12/13/24   PT: Bed Mobility Supine to/from sit;Modified independent;Within precautions   PT: Transfers Modified independent;Sit to/from stand;Bed to/from chair;Assistive device;Within precautions   PT: Gait Modified independent;150 feet;Rolling walker;Within precautions   PT: Stairs Supervision/stand-by assist;Within precautions;Assistive device;3 stairs;Rail on right   Therapeutic Activity   Therapeutic Activities: dynamic activities to improve functional performance Minutes (34770) 23   Symptoms Noted During/After Treatment Increased pain;Fatigue  (fatigue of L/E's; especially R)   Treatment Detail/Skilled Intervention Patient long sitting in bed; agreeable to participate. Time spent discussing and educating on spinal precautions including excessive forward flexion and the implications with discs. Able to move from supine with HOB raised approx. 15 degrees to R with modified I; to sit with CGA. Trial in sit to lift R L/E at hip and unable to bring toes off floor. Rested in sit/ waiting for aide. STS into WW with Mod plus Min plus cueing for hand placement. Two trial needed; unable to come to sit on first attempt. Notable weakness and buckling in R L/E. Pre-gait activities including weight shifting and forward and back steps with each foot. Therapist guarding at R knee for buckling which was noted along with hyperextension on heel strike. Took 6 steps forward with WW and Min of 2 but guarding for greater need. WC brought up; Min to sit to control descent as legs; basilio. R buckling during transer. Further STS at transport chair with CGA to Min; always guarding for need for more A sec. to R L/E  buckling. Last sit into recliner with Min to control descent as R L/E buckled fully but pt. controlled descent with U/E's on chair. During gait performed standing heel raises B with arms on WW and close CGA.   Gait Training   Gait Training Minutes (33482) 13   Symptoms Noted During/After Treatment (Gait Training) fatigue;increased pain  (fatigue of legs; basilio. R; not overall fatigue)   Treatment Detail/Skilled Intervention 130'   Distance in Feet Patient ambulated 130' with WW and Min of 1 plus close wc follow. Therapist guarding at R knee for buckling the whole time. Increased hyperextension on R at heel stike but notable potential for buckling each time though pt. mostly controlled with hyperextension. Narrow [place]; worked on widending but this caused further buckling on R. During last step, did have buckling requiring Mod A and transport chair to be brought under pt. Patient currenlty a very high fall risk.   PT Discharge Planning   PT Plan issue spinal prec. h. o., transfers, gait with WW and very close transport chair follow, guard on R knee for buckling, stairs when appropriate   PT Discharge Recommendation (DC Rec) Transitional Care Facility;home with assist;home with home care physical therapy;home with outpatient physical therapy   PT Rationale for DC Rec Patient currently still functioning below baseline and is a high fall risk. Mobilizing with Min of 2 to up to Mod A with knee buckling. Had good progress but still has significant buckling of R L/E placing him at risk for falls. Is open to going to sister and brother-in-law who are mostly there and able to work from home but, currently, is having significant buckling which would place them all at risk. If this persists, may need to consider TCU. If this diminshes, agree with plan for going to sister's home with home PT initially, progressing to OP for back specific PT at a later date.   PT Brief overview of current status Min to Mod for STS and gait with WW;  need second person d/t R knee buckling and fall risk in this case. Use of WW for pivot transers but, again, recommend second person for safety d/t R knee buckling. Goals of therapy will be to address safe mobility and make recs for d/c to next level of care. Pt and RN will continue to follow all falls risk precautions as documented by RN staff while hospitalized   PT Equipment Needed at Discharge walker, rolling   Physical Therapy Time and Intention   Timed Code Treatment Minutes 36   Total Session Time (sum of timed and untimed services) 46

## 2024-12-02 NOTE — PLAN OF CARE
Goal Outcome Evaluation:      Plan of Care Reviewed With: patient    Overall Patient Progress: improving     PRIMARY Concern: POD1S/P Rt L2-L3 minimally invasive micro dissection    SUMMARY  Orientation/Cognitive: A&OX4  Mobility Level/Assist Equipment: [id number]  Pain Management: Denies pain  Tele/VS/O2: VSS on RA  ABNL Lab/BG: None this shift  Diet: Reg  Bowel/Bladder: Mobley in place  Skin Concerns: Back incision, groin rash  Drains/Devices: PIV s/l  Aggression Tool Color: Green  Other Important info for NEXT shift: PT consult pending  Anticipated DC date & active delays: TBD

## 2024-12-02 NOTE — PROGRESS NOTES
Notified provider about indwelling velásquez catheter discussed removal or continued need.    Did provider choose to remove indwelling velásquez catheter? no    Provider's velásquez indication for keeping indwelling velásquez catheter: retention.    Is there an order for indwelling velásquez catheter? yes    *If there is a plan to keep velásquez catheter in place at discharge daily notification with provider is not necessary, but please add a notation in the treatment team sticky note that the patient will be discharging with the catheter.

## 2024-12-02 NOTE — PROGRESS NOTES
[facility]    Hospitalist Progress Note    Interval History   Patient is awake and alert.  No acute events overnight.  Underwent microdiscectomy yesterday and tolerated very well.  Is able to move his lower extremities better today but continues to have some numbness and tingling and weakness.  Denies severe pain with the lower back.    -Data reviewed today: I reviewed all new labs and imaging results over the last 24 hours. I personally reviewed the MRI lumbar spine image(s) showing L2-L3 disc bulge with large central disc protrusion producing severe spinal canal stenosis with complete effacement of all CSF spaces.  Markedly distended urinary bladder suggestive of retention.  Concern for cauda equina syndrome.  Moderate foraminal stenosis at L3-L4 and L4-L5.  Severe lateral stenosis at L4-L5. .    Physical Exam   Temp: 98.4  F (36.9  C) Temp src: Oral BP: 121/76 Pulse: 88   Resp: 17 SpO2: 98 % O2 Device: Nasal cannula    Vitals:    11/30/24 2155   Weight: 68 kg (149 lb 14.6 oz)     Vital Signs with Ranges  Temp:  [98  F (36.7  C)-98.4  F (36.9  C)] 98.4  F (36.9  C)  Pulse:  [82-98] 88  Resp:  [10-20] 17  BP: (117-132)/(74-87) 121/76  SpO2:  [95 %-98 %] 98 %  I/O last 3 completed shifts:  In: 740 [P.O.:240; I.V.:500]  Out: 2200 [Urine:2200]    Physical Exam  Constitutional:       Appearance: Normal appearance.   Cardiovascular:      Rate and Rhythm: Normal rate and regular rhythm.      Pulses: Normal pulses.      Heart sounds: Normal heart sounds.   Pulmonary:      Effort: Pulmonary effort is normal. No respiratory distress.      Breath sounds: Normal breath sounds.   Abdominal:      General: Abdomen is flat. Bowel sounds are normal. There is no distension.      Tenderness: There is no abdominal tenderness. There is no guarding.   Skin:     General: Skin is warm and dry.   Neurological:      General: No focal deficit present.      Comments: Reduced sensation in bilateral lower extremities.   Right leg 1 x 5 strength, left leg 2 x 5 strength.  Reduced reflexes.  Velásquez catheter in place.           Medications   Current Facility-Administered Medications   Medication Dose Route Frequency Provider Last Rate Last Admin     Current Facility-Administered Medications   Medication Dose Route Frequency Provider Last Rate Last Admin    gabapentin (NEURONTIN) capsule 300 mg  300 mg Oral TID Yuniel Reese PA-C   300 mg at 12/02/24 0816    methocarbamol (ROBAXIN) tablet 500 mg  500 mg Oral 4x Daily Yuniel Reese PA-C   500 mg at 12/02/24 1407    polyethylene glycol (MIRALAX) Packet 17 g  17 g Oral Daily Robe Kraus MD   17 g at 12/02/24 0817    sodium chloride (PF) 0.9% PF flush 3 mL  3 mL Intracatheter Q8H Yuniel Reese PA-C   3 mL at 12/02/24 1407    sodium chloride (PF) 0.9% PF flush 3 mL  3 mL Intracatheter Q8H Robe Kraus MD   3 mL at 12/02/24 1407       Data   Recent Labs   Lab 12/02/24  0552 12/01/24  0834 11/30/24  1838   WBC  --  8.0 6.8   HGB  --  13.5 13.6   MCV  --  93 92   PLT  --  135* 137*   NA  --  138 137   POTASSIUM  --  4.0 4.0   CHLORIDE  --  99 102   CO2  --  25 24   BUN  --  13.7 9.1   CR  --  0.89 0.79   ANIONGAP  --  14 11   ETTA  --  9.4 9.4   * 94 102*   ALBUMIN  --  4.6  --    PROTTOTAL  --  7.1  --    BILITOTAL  --  0.5  --    ALKPHOS  --  42  --    ALT  --  15  --    AST  --  25  --        No results found for this or any previous visit (from the past 24 hours).        Assessment & Plan      Randall Bateman is a 49 year old male with no significant PMH presenting from [facility] for NSGY consultation given worsening low back pain, LE weakness, and abnormal MRI with severe lumbar stenosis.         # Low back pain  # Lower extremity weakness  # Severe spinal canal stenosis  # Urinary retention   - presenting with low back pain, weakness, now with urinary retention s/p velásquez catheter placement; no saddle anesthesia; normal  rectal tone; has been evaluated by NSGY here, with plans for O.R in the morning.   -Patient underwent right L2-L3 minimally invasive microdissection.  Postop day 1.  -Postop pain management and DVT prophylaxis per neurosurgery.  -PT OT consulted.  -Multimodal pain control  -Maintain Mobley catheter.  Can attempt a trial of voiding tomorrow after patient is moving better  -Final discharge plan once cleared by neurosurgery.     #Tobacco abuse-patient smokes a pack a day.  Quit alcohol 2 years ago.  Was counseled extensively about quitting smoking and he is motivated.    DVT Prophylaxis: Pneumatic Compression Devices  Code Status: Full Code  Medically Ready for Discharge: Anticipated in 2-4 Days    Greater than 35-minute spent on documentation review, lab review, imaging review, examining the patient and discussing care    Fidelina Hyde MD, MD  [phone](p)

## 2024-12-03 VITALS
HEART RATE: 98 BPM | WEIGHT: 149.91 LBS | RESPIRATION RATE: 16 BRPM | TEMPERATURE: 97.6 F | SYSTOLIC BLOOD PRESSURE: 123 MMHG | BODY MASS INDEX: 20.91 KG/M2 | DIASTOLIC BLOOD PRESSURE: 77 MMHG | OXYGEN SATURATION: 94 %

## 2024-12-03 LAB — GLUCOSE BLDC GLUCOMTR-MCNC: 117 MG/DL (ref 70–99)

## 2024-12-03 PROCEDURE — 250N000013 HC RX MED GY IP 250 OP 250 PS 637: Performed by: PHYSICIAN ASSISTANT

## 2024-12-03 PROCEDURE — 250N000013 HC RX MED GY IP 250 OP 250 PS 637: Performed by: NURSE PRACTITIONER

## 2024-12-03 PROCEDURE — 97530 THERAPEUTIC ACTIVITIES: CPT | Mod: GP

## 2024-12-03 PROCEDURE — 99239 HOSP IP/OBS DSCHRG MGMT >30: CPT | Performed by: HOSPITALIST

## 2024-12-03 PROCEDURE — 250N000013 HC RX MED GY IP 250 OP 250 PS 637: Performed by: INTERNAL MEDICINE

## 2024-12-03 PROCEDURE — 97116 GAIT TRAINING THERAPY: CPT | Mod: GP

## 2024-12-03 RX ORDER — OXYCODONE HYDROCHLORIDE 5 MG/1
5 TABLET ORAL EVERY 6 HOURS PRN
Qty: 40 TABLET | Refills: 0 | Status: SHIPPED | OUTPATIENT
Start: 2024-12-03

## 2024-12-03 RX ORDER — IBUPROFEN 400 MG/1
400 TABLET, FILM COATED ORAL EVERY 6 HOURS PRN
COMMUNITY
Start: 2024-12-03

## 2024-12-03 RX ORDER — POLYETHYLENE GLYCOL 3350 17 G/17G
17 POWDER, FOR SOLUTION ORAL 2 TIMES DAILY
Status: DISCONTINUED | OUTPATIENT
Start: 2024-12-03 | End: 2024-12-03 | Stop reason: HOSPADM

## 2024-12-03 RX ORDER — ACETAMINOPHEN 325 MG/1
650 TABLET ORAL EVERY 4 HOURS PRN
COMMUNITY
Start: 2024-12-03

## 2024-12-03 RX ORDER — AMOXICILLIN 250 MG
2 CAPSULE ORAL 2 TIMES DAILY PRN
Qty: 40 TABLET | Refills: 0 | Status: SHIPPED | OUTPATIENT
Start: 2024-12-03

## 2024-12-03 RX ORDER — BISACODYL 10 MG
10 SUPPOSITORY, RECTAL RECTAL DAILY PRN
Status: DISCONTINUED | OUTPATIENT
Start: 2024-12-03 | End: 2024-12-03 | Stop reason: HOSPADM

## 2024-12-03 RX ORDER — METHOCARBAMOL 500 MG/1
500 TABLET, FILM COATED ORAL 4 TIMES DAILY PRN
Qty: 40 TABLET | Refills: 0 | Status: SHIPPED | OUTPATIENT
Start: 2024-12-03

## 2024-12-03 RX ADMIN — METHOCARBAMOL 500 MG: 500 TABLET ORAL at 14:47

## 2024-12-03 RX ADMIN — GABAPENTIN 300 MG: 300 CAPSULE ORAL at 15:52

## 2024-12-03 RX ADMIN — METHOCARBAMOL 500 MG: 500 TABLET ORAL at 17:53

## 2024-12-03 RX ADMIN — TAMSULOSIN HYDROCHLORIDE 0.4 MG: 0.4 CAPSULE ORAL at 08:57

## 2024-12-03 RX ADMIN — POLYETHYLENE GLYCOL 3350 17 G: 17 POWDER, FOR SOLUTION ORAL at 08:57

## 2024-12-03 RX ADMIN — GABAPENTIN 300 MG: 300 CAPSULE ORAL at 08:57

## 2024-12-03 RX ADMIN — METHOCARBAMOL 500 MG: 500 TABLET ORAL at 08:57

## 2024-12-03 ASSESSMENT — ACTIVITIES OF DAILY LIVING (ADL)
ADLS_ACUITY_SCORE: 54
DEPENDENT_IADLS:: INDEPENDENT
ADLS_ACUITY_SCORE: 54

## 2024-12-03 NOTE — PLAN OF CARE
Goal Outcome Evaluation:      Plan of Care Reviewed With: patient          Outcome Evaluation: Home w-[facility]

## 2024-12-03 NOTE — PLAN OF CARE
Goal Outcome Evaluation:    Orientations: Alert and oriented x 4  Vitals/Pain: Vital signs stable on room air. Pain controlled with schedule Robaxin and Ramses.  Tele: N/A  Skin/Wounds: back incision site  GI/: Bowel sounds normoactive, flatus present.  Labs: n/a  Ambulation/Assist: Up with assist of 1-2 with G&W.  Sleep Quality: good  Plan: continue plan of care

## 2024-12-03 NOTE — PLAN OF CARE
Goal Outcome Evaluation:                    PRIMARY Concern: POD1S/P Rt L2-L3 minimally invasive micro dissection    SUMMARY  Orientation/Cognitive: A&OX4  Mobility Level/Assist Equipment: AX2  Pain Management: Denies pain  Tele/VS/O2: VSS on RA  ABNL Lab/BG: None this shift  Diet: Reg  Bowel/Bladder: Mobley removed at 18.00 ,due to void   Skin Concerns: Back incision, groin rash  Drains/Devices: PIV s/l  Aggression Tool Color: Green  Other Important info for NEXT shift: PT consult recommending TCU  Anticipated DC date & active delays: TBD,

## 2024-12-03 NOTE — PROGRESS NOTES
Neurosurgery Progress Note:     POD#2 s/p L2-L3 minimally invasive microdiscectomy with Dr. Adams.    24 hours events: No events overnight.  Patient states that his right leg feels even stronger than it did last evening on examination.  He is looking forward to working with therapy today.      On exam: Patient is alert and oriented patient states that his bilateral lower extremity numbness and weakness has improved.  Patient states he is now able to lift his legs and ambulate with assistance of physical therapy in the halls which she was not able to do previously.  He states that his left thigh and lower leg numbness has improved/resolved.  He states that his right thigh and lower leg numbness has also improved when compared to preoperative.  He states that he still has bilateral foot numbness and tingling.  Incision: Clean dry and intact     PLAN:  -Neurosurgery okay with patient discharge can begin working on dispo depending on therapy recommendations which are currently for TCU  -PT OT-TCU  -Patient states that his legs still buckling hopefully he can progress to home with outpatient therapy or acute rehab  -Up with assist  -Discontinue Flomax patient having adequate voids to urinal     Neville Pang DNP  [facility] Neurosurgery  [facility]  Vocera messaging strongly preferred  Please always look up on-call provider before messaging/paging            Dragon Disclosure   This was created at least in part with a voice recognition software. Mistakes/typos may be present.

## 2024-12-03 NOTE — CONSULTS
Care Management Initial Consult    General Information  Assessment completed with: Patient Douglas  Type of CM/SW Visit: Initial Assessment    Primary Care Provider verified and updated as needed: Yes   Readmission within the last 30 days: current reason for admission unrelated to previous admission   Return Category: Progression of disease  Reason for Consult: discharge planning  Advance Care Planning: Advance Care Planning Reviewed: no concerns identified          Communication Assessment  Patient's communication style: spoken language (English or Bilingual)             Cognitive  Cognitive/Neuro/Behavioral: WDL  Level of Consciousness: alert  Arousal Level: opens eyes spontaneously  Orientation: oriented x 4  Mood/Behavior: calm, cooperative  Best Language: 0 - No aphasia  Speech: logical    Living Environment:   People in home: alone     Current living Arrangements: house      Able to return to prior arrangements: yes       Family/Social Support:  Care provided by: self  Provides care for: no one, pet(s)  Marital Status: Single  Support system: Friend, Sibling(s)          Description of Support System: Supportive, Involved         Current Resources:   Patient receiving home care services: No        Community Resources:    Equipment currently used at home: none  Supplies currently used at home:      Employment/Financial:  Employment Status: unemployed        Financial Concerns: none           Does the patient's insurance plan have a 3 day qualifying hospital stay waiver?  No    Lifestyle & Psychosocial Needs:  Social Drivers of Health     Food Insecurity: Not on file   Depression: Not on file   Housing Stability: Not on file   Tobacco Use: High Risk (11/30/2024)    Patient History     Smoking Tobacco Use: Every Day     Smokeless Tobacco Use: Never     Passive Exposure: Not on file   Financial Resource Strain: Not on file   Alcohol Use: Not on file   Transportation Needs: Not on file   Physical Activity: Not on file  "  Interpersonal Safety: Not on file   Stress: Not on file   Social Connections: Not on file   Health Literacy: Not on file       Functional Status:  Prior to admission patient needed assistance:   Dependent ADLs:: Independent  Dependent IADLs:: Independent  Assesssment of Functional Status: Not at baseline with mobility    Mental Health Status:  Mental Health Status: No Current Concerns       Chemical Dependency Status:                Values/Beliefs:  Spiritual, Cultural Beliefs, [employer] Practices, Values that affect care: no               Discussed  Partnership in Safe Discharge Planning  document with patient/family: Yes: Douglas    Additional Information:  Writer met with patient, introduced self and role in discharge planning. Douglas verified his address, phone number insurance, and that he [facility], but he can't remember her name. Douglas lives in a single story house in [place], he lives alone (with his dog), and is completely independent in his ADLs until this recent exacerbation/injury. 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 He has a sister that is about 1hr away, who can provide some assistance to him, but she works and has 4 dogs. He uses no assistive devices at home, but expects to discharge with a walker \"at least for awhile\". He is looking forward to going home, declines a TCU stay, but would like any home care assist we can give him until his strength returns. Writer sent a referral to the [facility] for home RN and PT, as he is a , also reached out to Nimco Flores ([phone]) to see if there are any resources through the [facility]. Waiting on return call. We will continue to follow for final discharge disposition.    Next Steps: [facility] agency,  left with the [facility]/Nimco Medrano, RN Care Coordinator  [facility]  [phone]      Addendum 6481: " "Nimco returned my call, gave me a contact number for the [facility] office [phone]. Unfortunately, the person who answered could not hear me, tried desk and cell phone. Writer called Nimco back, she called and left a VM with Promise(?) to return my call.       Addendum 1500: Call received from Yesi, at the [facility], she gave me the same contact number of [phone], but said to skip the menu system and choose option -0-. Yesi will send a referral for review for a home health aide as well as RN and PT, though she recommended we use our resources with his private insurance for home care. Yesi says it can take \"some time to be reviewed\", but that she will enter the information, and give CCRN number to the review team if there are questions    Addendum 1540: Writer received a phone call from Soco at the [facility], regarding Douglas's needs at discharge. She is going to have him approved for home personal care services for up to 3 months, to discharge anytime prior.  "

## 2024-12-03 NOTE — PROGRESS NOTES
[facility]  Hospitalist Progress Note        Pilo Guadalupe MD   12/03/2024        Interval History:        - Velásquez catheter removed 12/2/24 and voiding well  -reports lower extremity weakness and paresthesias have significantly improved; does have some tingling right foot  - passing gas but no bowel movement yet; on MiraLAX daily-- will switch to BID (12/3)  - Evaluated by PT and rec TCU; neurosurgery started on Flomax 0.4 mg daily (12/2)    Addendum:  - patient declines TCU; Care coordinator arranging for home care PT/RN  - neurosurgery cleared for discharge and patient wants to be discharged home today    Please see discharge summary for details         Assessment and Plan:        Randall Bateman is a 49 year old male with no significant PMH presenting from [facility] for NSGY consultation given worsening low back pain, LE weakness, and abnormal MRI with severe lumbar stenosis.      # Low back pain  # Lower extremity weakness  # Severe spinal canal stenosis s/p L2-3 minimally invasive microdissection (12/1/24)  # Urinary retention   - presented with low back pain, weakness, urinary retention s/p velásquez catheter placement; no saddle anesthesia; normal rectal tone on presentation  -evaluated by neurosurgery and underwent right L2-L3 minimally invasive microdissection  - Postop cares including pain management and DVT prophylaxis per neurosurgery  - Velásquez catheter removed 12/2/24 and voiding well  - reports lower extremity weakness and paresthesias have significantly improved; does have some tingling right foot  - passing gas but no bowel movement yet; on MiraLAX daily-- will switch to BID (12/3)  - Evaluated by PT and rec TCU; neurosurgery started on Flomax 0.4 mg daily (12/2)     #Tobacco abuse-patient smokes a pack a day.  Quit alcohol 2 years ago.  Was counseled extensively about quitting smoking and he is motivated.     DVT Prophylaxis: Pneumatic Compression Devices  Code  Status: Full Code    Diet: Advance Diet as Tolerated: Regular Diet Adult      Disposition:   Medically Ready for Discharge: Anticipated in 2-4 Days pending clinical stability and neurosurgery clearance  -PT/OT recommend TCU placement  - for disposition planning       Clinically Significant Risk Factors                                                          Physical Exam:      Blood pressure 97/68, pulse 85, temperature 98.3  F (36.8  C), temperature source Oral, resp. rate 17, weight 68 kg (149 lb 14.6 oz), SpO2 98%.  Vitals:    11/30/24 2155   Weight: 68 kg (149 lb 14.6 oz)     Vital Signs with Ranges  Temp:  [98.3  F (36.8  C)-98.4  F (36.9  C)] 98.3  F (36.8  C)  Pulse:  [85-88] 85  Resp:  [17] 17  BP: ()/(68-76) 97/68  SpO2:  [98 %] 98 %  I/O's Last 24 hours  I/O last 3 completed shifts:  In: 480 [P.O.:480]  Out: 2400 [Urine:2400]    Constitutional: Alert, awake and oriented X 3; resting comfortably in no apparent distress       Oral cavity: Moist mucosa   Cardiovascular: Normal s1 s2, regular rate and rhythm, no murmur   Lungs: B/l clear to auscultation, no wheezes or crepitations   Abdomen: Soft, nt, nd, no guarding, rigidity or rebound; BS +   LE : No edema   Musculoskeletal/Neuro Power 5/5 in all extremities; mild paresthesias right foot   Psychiatry: normal mood and affect                Medications:        Current Facility-Administered Medications   Medication Dose Route Frequency Provider Last Rate Last Admin    gabapentin (NEURONTIN) capsule 300 mg  300 mg Oral TID Yuniel Reese PA-C   300 mg at 12/02/24 2225    methocarbamol (ROBAXIN) tablet 500 mg  500 mg Oral 4x Daily Yuniel Reese PA-C   500 mg at 12/02/24 2225    polyethylene glycol (MIRALAX) Packet 17 g  17 g Oral Daily Robe Kraus MD   17 g at 12/02/24 0817    sodium chloride (PF) 0.9% PF flush 3 mL  3 mL Intracatheter Q8H Yuniel Reese PA-C   3 mL at 12/03/24 0621    sodium chloride  (PF) 0.9% PF flush 3 mL  3 mL Intracatheter Q8H Robe Kraus MD   3 mL at 12/02/24 1407    tamsulosin (FLOMAX) capsule 0.4 mg  0.4 mg Oral Daily BirdKaitlinan, CNP   0.4 mg at 12/02/24 1756     PRN Meds:   Current Facility-Administered Medications   Medication Dose Route Frequency Provider Last Rate Last Admin    acetaminophen (TYLENOL) tablet 650 mg  650 mg Oral Q4H PRN Robe Kraus MD        Or    acetaminophen (TYLENOL) Suppository 650 mg  650 mg Rectal Q4H PRN Robe Kraus MD        calcium carbonate (TUMS) chewable tablet 500 mg  500 mg Oral 4x Daily PRN Yuniel Reese PA-C        cyclobenzaprine (FLEXERIL) tablet 10 mg  10 mg Oral Q8H PRN Pj Murray MD   10 mg at 12/01/24 0830    HYDROmorphone (DILAUDID) injection 0.2 mg  0.2 mg Intravenous Q2H PRN Yuniel Reese PA-C        Or    HYDROmorphone (DILAUDID) injection 0.4 mg  0.4 mg Intravenous Q2H PRN Yuniel Reese PA-C        ibuprofen (ADVIL/MOTRIN) tablet 800 mg  800 mg Oral Q6H PRN Yuniel Reese PA-C        lidocaine (LMX4) cream   Topical Q1H PRN Yuniel Reese PA-C        lidocaine (LMX4) cream   Topical Q1H PRN Robe Kraus MD        lidocaine 1 % 0.1-1 mL  0.1-1 mL Other Q1H PRN Yuniel Reese PA-C        lidocaine 1 % 0.1-1 mL  0.1-1 mL Other Q1H PRN Robe Kraus MD        naloxone (NARCAN) injection 0.2 mg  0.2 mg Intravenous Q2 Min PRN Wallace Parrish MD        Or    naloxone (NARCAN) injection 0.4 mg  0.4 mg Intravenous Q2 Min PRN Wallace Parrish MD        Or    naloxone (NARCAN) injection 0.2 mg  0.2 mg Intramuscular Q2 Min PRN Wallace Parrish MD        Or    naloxone (NARCAN) injection 0.4 mg  0.4 mg Intramuscular Q2 Min PRN Wallace Parrish MD        ondansetron (ZOFRAN ODT) ODT tab 4 mg  4 mg Oral Q6H PRN Yuniel Reese PA-C        Or    ondansetron (ZOFRAN) injection 4 mg  4 mg Intravenous Q6H PRN Yuniel Reese PA-C         "oxyCODONE (ROXICODONE) tablet 5 mg  5 mg Oral Q4H PRN Yuniel Reese PA-C        Or    oxyCODONE (ROXICODONE) tablet 10 mg  10 mg Oral Q4H PRN Yuniel Reese PA-C        prochlorperazine (COMPAZINE) injection 10 mg  10 mg Intravenous Q6H PRN Yuniel Reese PA-C        Or    prochlorperazine (COMPAZINE) tablet 10 mg  10 mg Oral Q6H PRN Yuniel Reese PA-C        senna-docusate (SENOKOT-S/PERICOLACE) 8.6-50 MG per tablet 1 tablet  1 tablet Oral BID PRN Robe Kraus MD        Or    senna-docusate (SENOKOT-S/PERICOLACE) 8.6-50 MG per tablet 2 tablet  2 tablet Oral BID PRN Robe Kraus MD        sodium chloride (PF) 0.9% PF flush 3 mL  3 mL Intracatheter q1 min prn Yuniel Reese PA-C        sodium chloride (PF) 0.9% PF flush 3 mL  3 mL Intracatheter q1 min prn Robe Kraus MD                Data:      All new lab and imaging data was reviewed.   Recent Labs   Lab Test 12/01/24  0834 11/30/24  1838   WBC 8.0 6.8   HGB 13.5 13.6   MCV 93 92   * 137*      Recent Labs   Lab Test 12/03/24  0601 12/02/24  0552 12/01/24  0834 11/30/24  1838   NA  --   --  138 137   POTASSIUM  --   --  4.0 4.0   CHLORIDE  --   --  99 102   CO2  --   --  25 24   BUN  --   --  13.7 9.1   CR  --   --  0.89 0.79   ANIONGAP  --   --  14 11   ETTA  --   --  9.4 9.4   * 145* 94 102*     No lab results found.    Invalid input(s): \"TROP\", \"TROPONINIES\"      "

## 2024-12-03 NOTE — DISCHARGE SUMMARY
[facility]  Discharge Summary        Randall Bateman MRN# [id number]   YOB: 1975 Age: 49 year old     Date of Admission:  11/30/2024  Date of Discharge:  12/3/2024  Admitting Physician:  Robe Kraus MD  Discharge Physician: Pilo Guadalupe MD  Discharging Service: Hospitalist     Primary Provider: No Ref-Primary, Physician  Primary Care Physician Phone Number: None         Discharge Diagnoses/Problem Oriented Hospital Course (Providers):    Randall Bateman was admitted on 11/30/2024 by Robe Kraus MD and I would refer you to their history and physical.  The following problems were addressed during his hospitalization:    Randall Bateman is a 49 year old male with no significant PMH presenting from [facility] for NSGY consultation given worsening low back pain, LE weakness, and abnormal MRI with severe lumbar stenosis.      # Low back pain  # Lower extremity weakness  # Severe spinal canal stenosis s/p L2-3 minimally invasive microdissection (12/1/24)  # Urinary retention   - presented with low back pain, weakness, urinary retention s/p velásquez catheter placement; no saddle anesthesia; normal rectal tone on presentation  - evaluated by neurosurgery and underwent right L2-L3 minimally invasive microdissection  - Postop cares including pain management and DVT prophylaxis per neurosurgery  - Velásquez catheter removed 12/2/24 and voiding well  - reports lower extremity weakness and paresthesias have significantly improved; does have some tingling right foot and some gait instability  - Evaluated by PT and rec TCU but patient declined TCU; Care coordinator arranging for home care PT/RN  - neurosurgery cleared for discharge      #Tobacco abuse-patient smokes a pack a day.  Quit alcohol 2 years ago.  Was counseled extensively about quitting smoking and he is motivated.    Code Status: Full Code     Clinically Significant Risk Factors                                     # Financial/Environmental Concerns: none                        Brief Hospital Stay Summary Sent Home With Patient in AVS:        Reason for your hospital stay      You were admitted for a lumbar spine surgery for back pain.                Pending Results:        Unresulted Labs Ordered in the Past 30 Days of this Admission       No orders found from 10/31/2024 to 12/1/2024.              Discharge Instructions and Follow-Up:      Follow-up Appointments     Follow-up and recommended labs and tests       Your post-operative follow up appointments have been/will be made for two   weeks with a nurse for a well being and wound check visit as well as in   six weeks with a provider.        Follow-up and recommended labs and tests       Follow up with primary care provider, Physician No Ref-Primary, within 7   days for hospital follow- up.  The following labs/tests are recommended:   repeat CBC and BMP.    Follow up with Neurosurgery for post operative follow up as suggested.                Discharge Disposition:      Discharged to home        Discharge Medications:        Current Discharge Medication List        START taking these medications    Details   acetaminophen (TYLENOL) 325 MG tablet Take 2 tablets (650 mg) by mouth every 4 hours as needed for mild pain or other (and adjunct with moderate or severe pain or per patient request).    Associated Diagnoses: S/P lumbar microdiscectomy      ibuprofen (ADVIL/MOTRIN) 400 MG tablet Take 1 tablet (400 mg) by mouth every 6 hours as needed for other (inflammatory pain).    Associated Diagnoses: S/P lumbar microdiscectomy      methocarbamol (ROBAXIN) 500 MG tablet Take 1 tablet (500 mg) by mouth 4 times daily as needed for muscle spasms.  Qty: 40 tablet, Refills: 0    Associated Diagnoses: S/P lumbar microdiscectomy      senna-docusate (SENOKOT-S/PERICOLACE) 8.6-50 MG tablet Take 2 tablets by mouth 2 times daily as needed for constipation.  Qty: 40 tablet, Refills: 0     Associated Diagnoses: S/P lumbar microdiscectomy           CONTINUE these medications which have CHANGED    Details   oxyCODONE (ROXICODONE) 5 MG tablet Take 1 tablet (5 mg) by mouth every 6 hours as needed for moderate pain.  Qty: 40 tablet, Refills: 0    Associated Diagnoses: S/P lumbar microdiscectomy           CONTINUE these medications which have NOT CHANGED    Details   cyclobenzaprine (FLEXERIL) 10 MG tablet Take 1 tablet (10 mg) by mouth 3 times daily as needed for muscle spasms.  Qty: 30 tablet, Refills: 1      diclofenac (VOLTAREN) 1 % topical gel Apply 4 g topically 4 times daily.  Qty: 150 g, Refills: 1      gabapentin (NEURONTIN) 300 MG capsule Begin with 300 mg p.o. once daily, then increase to 300 mg p.o. twice daily, and then to 300 mg 3 times daily if needed for back pain.  Qty: 45 capsule, Refills: 0      Menthol, Topical Analgesic, (ICY HOT EX) Apply topically daily as needed (for lower back pain).               Allergies:       No Known Allergies        Consultations This Hospital Stay:      Consultation during this admission received from Neurosurgery        Condition and Physical on Discharge:        Discharge condition: Stable   Vitals: Blood pressure 123/77, pulse 98, temperature 97.6  F (36.4  C), temperature source Oral, resp. rate 16, weight 68 kg (149 lb 14.6 oz), SpO2 94%.     Constitutional: Alert, awake and oriented X 3; resting comfortably in no apparent distress         Oral cavity: Moist mucosa   Cardiovascular: Normal s1 s2, regular rate and rhythm, no murmur   Lungs: B/l clear to auscultation, no wheezes or crepitations   Abdomen: Soft, nt, nd, no guarding, rigidity or rebound; BS +   LE : No edema   Musculoskeletal/Neuro Power 5/5 in all extremities; mild paresthesias right foot   Psychiatry: normal mood and affect             Discharge Time:      Greater than 30 minutes.        Image Results From This Hospital Stay (For Non-EPIC Providers):        Results for orders placed or  "performed during the hospital encounter of 11/30/24   XR Surgery AVIS L/T 5 Min Fluoro w Stills    Narrative    SURGERY C-ARM FLUOROSCOPY LESS THAN FIVE MINUTES WITH STILLS   12/1/2024 1:12 PM     COMPARISON: Lumbar spine MR 11/30/2024.    HISTORY: L2-L3 microdiscectomy, fluoro time 4 seconds, 1.224mGy, 1  image, carm4.    NUMBER OF IMAGES ACQUIRED: 1    VIEWS: 1    FLUOROSCOPY TIME: 0.1 minutes.      Impression    IMPRESSION: Single provided lateral intraoperative image demonstrates  posterior surgical device directed at the L2-L3 interspace if there  are assumed to be five lumbar-type vertebral bodies. Please see  operative report for further details.    DOE EATON MD         SYSTEM ID:  [id number]           Most Recent Lab Results In EPIC (For Non-EPIC Providers):    Most Recent 3 CBC's:  Recent Labs   Lab Test 12/01/24  0834 11/30/24  1838   WBC 8.0 6.8   HGB 13.5 13.6   MCV 93 92   * 137*      Most Recent 3 BMP's:  Recent Labs   Lab Test 12/03/24  0601 12/02/24  0552 12/01/24  0834 11/30/24  1838   NA  --   --  138 137   POTASSIUM  --   --  4.0 4.0   CHLORIDE  --   --  99 102   CO2  --   --  25 24   BUN  --   --  13.7 9.1   CR  --   --  0.89 0.79   ANIONGAP  --   --  14 11   ETTA  --   --  9.4 9.4   * 145* 94 102*     Most Recent 3 Troponin's:No lab results found.    Invalid input(s): \"TROP\", \"TROPONINIES\"  Most Recent 3 INR's:No lab results found.  Most Recent 2 LFT's:  Recent Labs   Lab Test 12/01/24  0834   AST 25   ALT 15   ALKPHOS 42   BILITOTAL 0.5     Most Recent Cholesterol Panel:No lab results found.  Most Recent 6 Bacteria Isolates From Any Culture (See EPIC Reports for Culture Details):No lab results found.  Most Recent TSH, T4 and HgbA1c: No lab results found.            "

## 2024-12-03 NOTE — PROGRESS NOTES
Patient ambulation status: Pt continues to have numbness and weakness. Therapy reported another episode of pt's leg buckling during therapy. Pt reports that it was worse yesterday.   Patient able to stand for X-ray:Yes  Standing/upright x-ray complete:   Patient using oral analgesics:yes  Voiding spontaneously:yes  Drains discontinued:yes  Incision clean and dry:yes  Bowel status:Passing gas. No BM yet.     Possible discharge today. PT recommending Tcu. Pt wants to go home.

## 2024-12-04 NOTE — PLAN OF CARE
"Physical Therapy Discharge Summary    Reason for therapy discharge:    Discharged to home with home therapy.    Progress towards therapy goal(s). See goals on Care Plan in [facility] electronic health record for goal details.  Goals partially met.  Barriers to achieving goals:   discharge from facility.    Therapy recommendation(s):    Continued therapy is recommended.  Rationale/Recommendations:  per most recent PT note: \"Patient currently still functioning below baseline and is a high fall risk. Mobilizing with Ax1 with FWW, improvement on this date but had 1 major buckle needing modA to maintain standing. Pt is a high falls risk. For this reason rec TCF. Per conversation on this date, pt unclear on where he wants to d/c because he has pet dog, home which needs regular maintence, and sister's home has several animals (adding his dog to the mix would make it 5 dogs) and he has concerns about this. If pt goes home, rec Ax1 with IADLs, SBA for showers, and would need HH PT likely\" .      "

## 2024-12-04 NOTE — PLAN OF CARE
Goal Outcome Evaluation: Adequate for discharge    VSS on RA, pain controlled with PO meds. Tolerating diet, SBA in transfers, and family to help patient overnight. Voiding adequately. Discharge instruction done, questions encouraged and answered, patient and family acknowledged understanding.

## 2024-12-05 ENCOUNTER — PATIENT OUTREACH (OUTPATIENT)
Dept: CARE COORDINATION | Facility: CLINIC | Age: 49
End: 2024-12-05
Payer: COMMERCIAL

## 2024-12-05 NOTE — PROGRESS NOTES
[facility]:   [facility] Contact  [facility]/Voicemail     Clinical Data: Post-Discharge Outreach     Outreach attempted x 2.  Left message on patient's voicemail, providing [facility]'s central phone number of [phone] ([phone]) for questions/concerns and/or to schedule an appt with an [facility] provider, if they do not have a PCP.      Plan:  [facility] will do no further outreaches at this time.       Niki Bacon  Community Health Worker  [facility], [facility]  [phone]      *Connected Care Resource Team does NOT follow patient ongoing. Referrals are identified based on internal discharge reports and the outreach is to ensure patient has an understanding of their discharge instructions.

## 2024-12-29 ENCOUNTER — HEALTH MAINTENANCE LETTER (OUTPATIENT)
Age: 49
End: 2024-12-29

## 2025-01-21 ENCOUNTER — OFFICE VISIT (OUTPATIENT)
Dept: NEUROSURGERY | Facility: CLINIC | Age: 50
End: 2025-01-21
Payer: COMMERCIAL

## 2025-01-21 VITALS
HEIGHT: 71 IN | SYSTOLIC BLOOD PRESSURE: 124 MMHG | HEART RATE: 67 BPM | BODY MASS INDEX: 20.47 KG/M2 | DIASTOLIC BLOOD PRESSURE: 66 MMHG | WEIGHT: 146.2 LBS

## 2025-01-21 DIAGNOSIS — Z98.890 S/P LUMBAR MICRODISCECTOMY: Primary | ICD-10-CM

## 2025-01-21 PROCEDURE — 99024 POSTOP FOLLOW-UP VISIT: CPT | Performed by: NURSE PRACTITIONER

## 2025-01-21 ASSESSMENT — PAIN SCALES - GENERAL: PAINLEVEL_OUTOF10: MILD PAIN (2)

## 2025-01-21 NOTE — PATIENT INSTRUCTIONS
-Continue to increase activities as tolerated.  -Follow up in clinic as scheduled.  -Please contact our clinic with questions or concerns at [phone].

## 2025-01-21 NOTE — PROGRESS NOTES
"[facility]  Neurosurgery Follow Up:    HPI: 6 weeks s/p MIS right L2-3 microdiscectomy with Dr. Adams on 12/1/2024. Doing well. Continues to see improvement in pain, numbness in toes and strength in legs. Gradually increasing activities as well. No incisional complaints.     Medical, surgical, family, and social history unchanged since prior exam.  Exam:  Constitutional:  Alert, well nourished, NAD.  HEENT: Normocephalic, atraumatic.   Pulm:  Without shortness of breath   CV:  No pitting edema of BLE.      Vital Signs:  /66   Pulse 67   Ht 5' 11\" (1.803 m)   Wt 146 lb 3.2 oz (66.3 kg)   BMI 20.39 kg/m      Neurological:  Awake  Alert  Oriented x 3  Motor exam: right weaker than left with right hip flexion 4/5    Able to spontaneously move L/E bilaterally  Decreased sensation of toes right>left     Incisions:  Healing nicely.  Imaging: No imaging to review.     A/P: s/p lumbar microdiscectomy  May continue to increase activities as tolerated. [facility] Follow up in clinic as scheduled. He verbalized understanding and agreement.   Patient Instructions   -Continue to increase activities as tolerated.  -Follow up in clinic as scheduled.  -Please contact our clinic with questions or concerns at [phone].    Serina Wilkerson, MIRZA  [facility]  [address] [place] [address]  Tel [phone]  Fax [phone]    "

## 2025-01-21 NOTE — LETTER
"1/21/2025      Randall Bateman  [address] [facility] [address]      Dear Colleague,    Thank you for referring your patient, Randall Bateman, to the [facility]. Please see a copy of my visit note below.    [facility]  Neurosurgery Follow Up:    HPI: 6 weeks s/p MIS right L2-3 microdiscectomy with Dr. Adams on 12/1/2024. Doing well. Continues to see improvement in pain, numbness in toes and strength in legs. Gradually increasing activities as well. No incisional complaints.     Medical, surgical, family, and social history unchanged since prior exam.  Exam:  Constitutional:  Alert, well nourished, NAD.  HEENT: Normocephalic, atraumatic.   Pulm:  Without shortness of breath   CV:  No pitting edema of BLE.      Vital Signs:  /66   Pulse 67   Ht 5' 11\" (1.803 m)   Wt 146 lb 3.2 oz (66.3 kg)   BMI 20.39 kg/m      Neurological:  Awake  Alert  Oriented x 3  Motor exam: right weaker than left with right hip flexion 4/5    Able to spontaneously move L/E bilaterally  Decreased sensation of toes right>left     Incisions:  Healing nicely.  Imaging: No imaging to review.     A/P: s/p lumbar microdiscectomy  May continue to increase activities as tolerated. Ok to [facility]. Follow up in clinic as scheduled. He verbalized understanding and agreement.   Patient Instructions   -Continue to increase activities as tolerated.  -Follow up in clinic as scheduled.  -Please contact our clinic with questions or concerns at [phone].    Serina Wilkerson CNP  [facility]  [address] [place] [address]  [address] [place] [address]  Tel [phone]  Fax [phone]      Again, thank you for allowing me to participate in the care of your patient.        Sincerely,        Serina Wilkerson NP    Electronically signed"

## 2025-01-21 NOTE — NURSING NOTE
"Randall Bateman is a 49 year old male who presents for:  Chief Complaint   Patient presents with    Neurologic Problem     Right L2-3 MIS discectomy. DOS 12/1/24. Patient notes his back feels pretty good. He can tell when he has been on his feet too long or sits to long. His right leg will have some weakness, not pain. He is just dealing with the weakness but has no pain. He can feel the muscles are starting to work again.        Vitals:    Vitals:    01/21/25 1120   BP: 124/66   Pulse: 67   Weight: 146 lb 3.2 oz (66.3 kg)   Height: 5' 11\" (1.803 m)       BMI:  Estimated body mass index is 20.39 kg/m  as calculated from the following:    Height as of this encounter: 5' 11\" (1.803 m).    Weight as of this encounter: 146 lb 3.2 oz (66.3 kg).    Pain Score:  Mild Pain (2)        Mel Driscoll CMA      "

## 2025-02-24 ENCOUNTER — OFFICE VISIT (OUTPATIENT)
Dept: NEUROSURGERY | Facility: CLINIC | Age: 50
End: 2025-02-24
Payer: COMMERCIAL

## 2025-02-24 VITALS
TEMPERATURE: 97.7 F | HEART RATE: 72 BPM | OXYGEN SATURATION: 95 % | WEIGHT: 155 LBS | SYSTOLIC BLOOD PRESSURE: 104 MMHG | BODY MASS INDEX: 21.62 KG/M2 | RESPIRATION RATE: 16 BRPM | DIASTOLIC BLOOD PRESSURE: 67 MMHG

## 2025-02-24 DIAGNOSIS — Z98.890 S/P LUMBAR MICRODISCECTOMY: Primary | ICD-10-CM

## 2025-02-24 PROCEDURE — 99024 POSTOP FOLLOW-UP VISIT: CPT | Performed by: NURSE PRACTITIONER

## 2025-02-24 ASSESSMENT — PAIN SCALES - GENERAL: PAINLEVEL_OUTOF10: NO PAIN (0)

## 2025-02-24 NOTE — PROGRESS NOTES
[facility]  Neurosurgery Follow Up:    HPI: Almost 3 months s/p right L2-3 MIS microdiscectomy with Dr. Adams on 12/1/2024. Doing well. Continues to see improvement. Reports some back soreness with increased activities. Some left lateral thigh burning with prolonged sitting or increased activities. Numbness in toes has resolved. Strength in legs improving. No incisional complaints. [facility]     Medical, surgical, family, and social history unchanged since prior exam.  Exam:  Constitutional:  Alert, well nourished, NAD.  HEENT: Normocephalic, atraumatic.   Pulm:  Without shortness of breath   CV:  No pitting edema of BLE.      Vital Signs:  /67   Pulse 72   Temp 97.7  F (36.5  C) (Temporal)   Resp 16   Wt 70.3 kg (155 lb)   SpO2 95%   BMI 21.62 kg/m      Neurological:  Awake  Alert  Oriented x 3  Motor exam: intact except weakness in hip flexion right>left     Able to spontaneously move L/E bilaterally  Sensation intact throughout all L/E dermatomes     Incisions:  Healing nicely  Imaging: No new imaging to review    A/P: s/p lumbar microdiscectomy  May continue to advance activities as tolerated. Continue to work with PT as it has been helpful. Ok to return to work without restrictions. Follow up as needed. He verbalized understanding and agreement.   Patient Instructions   -Continue to increase activities as tolerated. Continue therapies.  -Follow up as needed.   -Please contact our clinic with questions or concerns at [phone].    Serina Wilkerson CNP  [facility]  [address] [place] [address]  [address] [place] [address]  Tel [phone]  Fax [phone]

## 2025-02-24 NOTE — NURSING NOTE
"Randall Bateman is a 49 year old male who presents for:  Chief Complaint   Patient presents with    Neurologic Problem     Right Lumbar 2 to Lumbar 3 minimally invasive microdiscectomy 12/1/2024 Dr powell         Initial Vitals:  /67   Pulse 72   Temp 97.7  F (36.5  C) (Temporal)   Resp 16   Wt 155 lb (70.3 kg)   SpO2 95%   BMI 21.62 kg/m   Estimated body mass index is 21.62 kg/m  as calculated from the following:    Height as of 1/21/25: 5' 11\" (1.803 m).    Weight as of this encounter: 155 lb (70.3 kg).. Body surface area is 1.88 meters squared. BP completed using cuff size: regular  No Pain (0)    Nursing Comments:     Jackelyn Flores    "

## 2025-02-24 NOTE — PATIENT INSTRUCTIONS
-Continue to increase activities as tolerated. Continue therapies.  -Follow up as needed.   -Please contact our clinic with questions or concerns at [phone].

## 2025-02-24 NOTE — LETTER
2/24/2025      Randall Bateman  [address] [facility] [id number]      Dear Colleague,    Thank you for referring your patient, Randall Bateman, to the [facility] [place]. Please see a copy of my visit note below.    [facility]  Neurosurgery Follow Up:    HPI: Almost 3 months s/p right L2-3 MIS microdiscectomy with Dr. Adams on 12/1/2024. Doing well. Continues to see improvement. Reports some back soreness with increased activities. Some left lateral thigh burning with prolonged sitting or increased activities. Numbness in toes has resolved. Strength in legs improving. No incisional complaints. [facility].     Medical, surgical, family, and social history unchanged since prior exam.  Exam:  Constitutional:  Alert, well nourished, NAD.  HEENT: Normocephalic, atraumatic.   Pulm:  Without shortness of breath   CV:  No pitting edema of BLE.      Vital Signs:  /67   Pulse 72   Temp 97.7  F (36.5  C) (Temporal)   Resp 16   Wt 70.3 kg (155 lb)   SpO2 95%   BMI 21.62 kg/m      Neurological:  Awake  Alert  Oriented x 3  Motor exam: intact except weakness in hip flexion right>left     Able to spontaneously move L/E bilaterally  Sensation intact throughout all L/E dermatomes     Incisions:  Healing nicely  Imaging: No new imaging to review    A/P: s/p lumbar microdiscectomy  May continue to advance activities as tolerated. Continue to work with PT as it has been helpful. Ok to return to work without restrictions. Follow up as needed. He verbalized understanding and agreement.   Patient Instructions   -Continue to increase activities as tolerated. Continue therapies.  -Follow up as needed.   -Please contact our clinic with questions or concerns at [phone].    Serina Wilkerson, MIRZA  [facility]  [address] [place] [address]  [address] [place] [id number]  Tel [phone]  Fax [phone]      Again, thank  you for allowing me to participate in the care of your patient.        Sincerely,        Serina Wilkerson NP    Electronically signed

## (undated) DEVICE — ESU GROUND PAD UNIVERSAL W/O CORD

## (undated) DEVICE — SU VICRYL 2-0 CT-2 CR 8X18" J726D

## (undated) DEVICE — DRAPE SHEET REV FOLD 3/4 9349

## (undated) DEVICE — LINEN TOWEL PACK X5 5464

## (undated) DEVICE — PREP CHLORAPREP 26ML TINTED HI-LITE ORANGE 930815

## (undated) DEVICE — SOL WATER IRRIG 1000ML BOTTLE 2F7114

## (undated) DEVICE — MANIFOLD NEPTUNE 4 PORT 700-20

## (undated) DEVICE — SYR BULB IRRIG DOVER 60 ML LATEX FREE 67000

## (undated) DEVICE — GOWN XXL 9575

## (undated) DEVICE — POSITIONER PT PRONESAFE HEAD REST W/DERMAPROX INSERT 40599

## (undated) DEVICE — DECANTER BAG 2002S

## (undated) DEVICE — SU VICRYL 0 UR-6 27" J603H

## (undated) DEVICE — RX SURGIFLO HEMOSTATIC MATRIX W/THROMBIN 8ML 2994

## (undated) DEVICE — NDL BLUNT 17GA 1.5" 8881202330

## (undated) DEVICE — DRAPE MAYO STAND 23X54 8337

## (undated) DEVICE — ESU PENCIL W/HOLSTER E2350H

## (undated) DEVICE — GLOVE BIOGEL PI MICRO INDICATOR UNDERGLOVE SZ 8.5 48985

## (undated) DEVICE — SPONGE SURGIFOAM 50

## (undated) DEVICE — NDL BLUNT 19GA 1.5"

## (undated) DEVICE — ESU ELEC BLADE 6" COATED/INSULATED E1455-6

## (undated) DEVICE — GLOVE BIOGEL PI MICRO SZ 7.5 48575

## (undated) DEVICE — SU VICRYL 0 CT-1 CR 8X18" J740D

## (undated) DEVICE — PACK SPINE SM CUSTOM SNE15SSFSK

## (undated) DEVICE — NDL SPINAL 18GA 3.5" 405184

## (undated) DEVICE — DRAPE MICROSCOPE LEICA 54X150" AR8033650

## (undated) DEVICE — DRAPE C-ARM 60X42" 1013

## (undated) RX ORDER — DEXAMETHASONE SODIUM PHOSPHATE 10 MG/ML
INJECTION, SOLUTION INTRAMUSCULAR; INTRAVENOUS
Status: DISPENSED
Start: 2022-02-15

## (undated) RX ORDER — PROPOFOL 10 MG/ML
INJECTION, EMULSION INTRAVENOUS
Status: DISPENSED
Start: 2024-12-01

## (undated) RX ORDER — ONDANSETRON 2 MG/ML
INJECTION INTRAMUSCULAR; INTRAVENOUS
Status: DISPENSED
Start: 2024-12-01

## (undated) RX ORDER — FENTANYL CITRATE 50 UG/ML
INJECTION, SOLUTION INTRAMUSCULAR; INTRAVENOUS
Status: DISPENSED
Start: 2024-12-01

## (undated) RX ORDER — DEXAMETHASONE SODIUM PHOSPHATE 4 MG/ML
INJECTION, SOLUTION INTRA-ARTICULAR; INTRALESIONAL; INTRAMUSCULAR; INTRAVENOUS; SOFT TISSUE
Status: DISPENSED
Start: 2024-12-01

## (undated) RX ORDER — BUPIVACAINE HYDROCHLORIDE 2.5 MG/ML
INJECTION, SOLUTION EPIDURAL; INFILTRATION; INTRACAUDAL
Status: DISPENSED
Start: 2022-02-15

## (undated) RX ORDER — HYDROMORPHONE HYDROCHLORIDE 1 MG/ML
INJECTION, SOLUTION INTRAMUSCULAR; INTRAVENOUS; SUBCUTANEOUS
Status: DISPENSED
Start: 2024-12-01